# Patient Record
Sex: FEMALE | Race: WHITE | NOT HISPANIC OR LATINO | URBAN - METROPOLITAN AREA
[De-identification: names, ages, dates, MRNs, and addresses within clinical notes are randomized per-mention and may not be internally consistent; named-entity substitution may affect disease eponyms.]

---

## 2019-03-22 ENCOUNTER — EMERGENCY (EMERGENCY)
Facility: HOSPITAL | Age: 74
LOS: 1 days | Discharge: HOME | End: 2019-03-22
Attending: EMERGENCY MEDICINE | Admitting: EMERGENCY MEDICINE
Payer: MEDICARE

## 2019-03-22 VITALS
OXYGEN SATURATION: 96 % | TEMPERATURE: 98 F | HEART RATE: 71 BPM | SYSTOLIC BLOOD PRESSURE: 138 MMHG | DIASTOLIC BLOOD PRESSURE: 47 MMHG | RESPIRATION RATE: 17 BRPM

## 2019-03-22 VITALS
RESPIRATION RATE: 16 BRPM | TEMPERATURE: 99 F | OXYGEN SATURATION: 98 % | SYSTOLIC BLOOD PRESSURE: 139 MMHG | HEART RATE: 86 BPM | DIASTOLIC BLOOD PRESSURE: 61 MMHG

## 2019-03-22 LAB
ALBUMIN SERPL ELPH-MCNC: 3.5 G/DL — SIGNIFICANT CHANGE UP (ref 3.3–5)
ALP SERPL-CCNC: 68 U/L — SIGNIFICANT CHANGE UP (ref 40–120)
ALT FLD-CCNC: 22 U/L — SIGNIFICANT CHANGE UP (ref 4–33)
ANION GAP SERPL CALC-SCNC: 15 MMO/L — HIGH (ref 7–14)
AST SERPL-CCNC: 19 U/L — SIGNIFICANT CHANGE UP (ref 4–32)
BASE EXCESS BLDV CALC-SCNC: -4.8 MMOL/L — SIGNIFICANT CHANGE UP
BASE EXCESS BLDV CALC-SCNC: -6.7 MMOL/L — SIGNIFICANT CHANGE UP
BASOPHILS # BLD AUTO: 0.04 K/UL — SIGNIFICANT CHANGE UP (ref 0–0.2)
BASOPHILS NFR BLD AUTO: 0.3 % — SIGNIFICANT CHANGE UP (ref 0–2)
BILIRUB SERPL-MCNC: 0.7 MG/DL — SIGNIFICANT CHANGE UP (ref 0.2–1.2)
BLOOD GAS VENOUS - CREATININE: SIGNIFICANT CHANGE UP MG/DL (ref 0.5–1.3)
BLOOD GAS VENOUS - CREATININE: SIGNIFICANT CHANGE UP MG/DL (ref 0.5–1.3)
BUN SERPL-MCNC: 17 MG/DL — SIGNIFICANT CHANGE UP (ref 7–23)
CALCIUM SERPL-MCNC: 9.1 MG/DL — SIGNIFICANT CHANGE UP (ref 8.4–10.5)
CHLORIDE BLDV-SCNC: 112 MMOL/L — HIGH (ref 96–108)
CHLORIDE BLDV-SCNC: 113 MMOL/L — HIGH (ref 96–108)
CHLORIDE SERPL-SCNC: 103 MMOL/L — SIGNIFICANT CHANGE UP (ref 98–107)
CO2 SERPL-SCNC: 19 MMOL/L — LOW (ref 22–31)
CREAT SERPL-MCNC: 1.45 MG/DL — HIGH (ref 0.5–1.3)
EOSINOPHIL # BLD AUTO: 0.04 K/UL — SIGNIFICANT CHANGE UP (ref 0–0.5)
EOSINOPHIL NFR BLD AUTO: 0.3 % — SIGNIFICANT CHANGE UP (ref 0–6)
GAS PNL BLDV: 135 MMOL/L — LOW (ref 136–146)
GAS PNL BLDV: 135 MMOL/L — LOW (ref 136–146)
GLUCOSE BLDV-MCNC: 124 — HIGH (ref 70–99)
GLUCOSE BLDV-MCNC: 167 — HIGH (ref 70–99)
GLUCOSE SERPL-MCNC: 156 MG/DL — HIGH (ref 70–99)
HCO3 BLDV-SCNC: 18 MMOL/L — LOW (ref 20–27)
HCO3 BLDV-SCNC: 19 MMOL/L — LOW (ref 20–27)
HCT VFR BLD CALC: 45 % — SIGNIFICANT CHANGE UP (ref 34.5–45)
HCT VFR BLDV CALC: 38.3 % — SIGNIFICANT CHANGE UP (ref 34.5–45)
HCT VFR BLDV CALC: 49.8 % — HIGH (ref 34.5–45)
HGB BLD-MCNC: 15 G/DL — SIGNIFICANT CHANGE UP (ref 11.5–15.5)
HGB BLDV-MCNC: 12.4 G/DL — SIGNIFICANT CHANGE UP (ref 11.5–15.5)
HGB BLDV-MCNC: 16.3 G/DL — HIGH (ref 11.5–15.5)
IMM GRANULOCYTES NFR BLD AUTO: 0.7 % — SIGNIFICANT CHANGE UP (ref 0–1.5)
LACTATE BLDV-MCNC: 1.3 MMOL/L — SIGNIFICANT CHANGE UP (ref 0.5–2)
LACTATE BLDV-MCNC: 2.6 MMOL/L — HIGH (ref 0.5–2)
LIDOCAIN IGE QN: 65.3 U/L — HIGH (ref 7–60)
LYMPHOCYTES # BLD AUTO: 1.51 K/UL — SIGNIFICANT CHANGE UP (ref 1–3.3)
LYMPHOCYTES # BLD AUTO: 11.2 % — LOW (ref 13–44)
MCHC RBC-ENTMCNC: 30.8 PG — SIGNIFICANT CHANGE UP (ref 27–34)
MCHC RBC-ENTMCNC: 33.3 % — SIGNIFICANT CHANGE UP (ref 32–36)
MCV RBC AUTO: 92.4 FL — SIGNIFICANT CHANGE UP (ref 80–100)
MONOCYTES # BLD AUTO: 1.44 K/UL — HIGH (ref 0–0.9)
MONOCYTES NFR BLD AUTO: 10.7 % — SIGNIFICANT CHANGE UP (ref 2–14)
NEUTROPHILS # BLD AUTO: 10.36 K/UL — HIGH (ref 1.8–7.4)
NEUTROPHILS NFR BLD AUTO: 76.8 % — SIGNIFICANT CHANGE UP (ref 43–77)
NRBC # FLD: 0 K/UL — LOW (ref 25–125)
PCO2 BLDV: 39 MMHG — LOW (ref 41–51)
PCO2 BLDV: 41 MMHG — SIGNIFICANT CHANGE UP (ref 41–51)
PH BLDV: 7.28 PH — LOW (ref 7.32–7.43)
PH BLDV: 7.33 PH — SIGNIFICANT CHANGE UP (ref 7.32–7.43)
PLATELET # BLD AUTO: 421 K/UL — HIGH (ref 150–400)
PMV BLD: 11.5 FL — SIGNIFICANT CHANGE UP (ref 7–13)
PO2 BLDV: 23 MMHG — LOW (ref 35–40)
PO2 BLDV: 33 MMHG — LOW (ref 35–40)
POTASSIUM BLDV-SCNC: 3.6 MMOL/L — SIGNIFICANT CHANGE UP (ref 3.4–4.5)
POTASSIUM BLDV-SCNC: 3.8 MMOL/L — SIGNIFICANT CHANGE UP (ref 3.4–4.5)
POTASSIUM SERPL-MCNC: 3.9 MMOL/L — SIGNIFICANT CHANGE UP (ref 3.5–5.3)
POTASSIUM SERPL-SCNC: 3.9 MMOL/L — SIGNIFICANT CHANGE UP (ref 3.5–5.3)
PROT SERPL-MCNC: 6.5 G/DL — SIGNIFICANT CHANGE UP (ref 6–8.3)
RBC # BLD: 4.87 M/UL — SIGNIFICANT CHANGE UP (ref 3.8–5.2)
RBC # FLD: 13.9 % — SIGNIFICANT CHANGE UP (ref 10.3–14.5)
SAO2 % BLDV: 35.3 % — LOW (ref 60–85)
SAO2 % BLDV: 55.6 % — LOW (ref 60–85)
SODIUM SERPL-SCNC: 137 MMOL/L — SIGNIFICANT CHANGE UP (ref 135–145)
TROPONIN T, HIGH SENSITIVITY: 18 NG/L — SIGNIFICANT CHANGE UP (ref ?–14)
TROPONIN T, HIGH SENSITIVITY: 19 NG/L — SIGNIFICANT CHANGE UP (ref ?–14)
WBC # BLD: 13.48 K/UL — HIGH (ref 3.8–10.5)
WBC # FLD AUTO: 13.48 K/UL — HIGH (ref 3.8–10.5)

## 2019-03-22 PROCEDURE — 93010 ELECTROCARDIOGRAM REPORT: CPT

## 2019-03-22 PROCEDURE — 99285 EMERGENCY DEPT VISIT HI MDM: CPT | Mod: 25

## 2019-03-22 PROCEDURE — 71046 X-RAY EXAM CHEST 2 VIEWS: CPT | Mod: 26

## 2019-03-22 PROCEDURE — 70486 CT MAXILLOFACIAL W/O DYE: CPT | Mod: 26

## 2019-03-22 PROCEDURE — 72125 CT NECK SPINE W/O DYE: CPT | Mod: 26

## 2019-03-22 PROCEDURE — 70450 CT HEAD/BRAIN W/O DYE: CPT | Mod: 26

## 2019-03-22 PROCEDURE — 74177 CT ABD & PELVIS W/CONTRAST: CPT | Mod: 26

## 2019-03-22 RX ORDER — ONDANSETRON 8 MG/1
1 TABLET, FILM COATED ORAL
Qty: 15 | Refills: 0 | OUTPATIENT
Start: 2019-03-22 | End: 2019-03-26

## 2019-03-22 RX ORDER — MORPHINE SULFATE 50 MG/1
2 CAPSULE, EXTENDED RELEASE ORAL ONCE
Qty: 0 | Refills: 0 | Status: DISCONTINUED | OUTPATIENT
Start: 2019-03-22 | End: 2019-03-22

## 2019-03-22 RX ORDER — LOPERAMIDE HCL 2 MG
1 TABLET ORAL
Qty: 5 | Refills: 0 | OUTPATIENT
Start: 2019-03-22 | End: 2019-03-26

## 2019-03-22 RX ORDER — SODIUM CHLORIDE 9 MG/ML
500 INJECTION INTRAMUSCULAR; INTRAVENOUS; SUBCUTANEOUS ONCE
Qty: 0 | Refills: 0 | Status: COMPLETED | OUTPATIENT
Start: 2019-03-22 | End: 2019-03-22

## 2019-03-22 RX ORDER — FAMOTIDINE 10 MG/ML
20 INJECTION INTRAVENOUS ONCE
Qty: 0 | Refills: 0 | Status: COMPLETED | OUTPATIENT
Start: 2019-03-22 | End: 2019-03-22

## 2019-03-22 RX ORDER — HYDROCORTISONE/PRAMOXINE 2.5 %-1 %
1 CREAM WITH APPLICATOR RECTAL
Qty: 1 | Refills: 0 | OUTPATIENT
Start: 2019-03-22

## 2019-03-22 RX ORDER — SODIUM CHLORIDE 9 MG/ML
1000 INJECTION INTRAMUSCULAR; INTRAVENOUS; SUBCUTANEOUS ONCE
Qty: 0 | Refills: 0 | Status: COMPLETED | OUTPATIENT
Start: 2019-03-22 | End: 2019-03-22

## 2019-03-22 RX ADMIN — MORPHINE SULFATE 2 MILLIGRAM(S): 50 CAPSULE, EXTENDED RELEASE ORAL at 15:24

## 2019-03-22 RX ADMIN — SODIUM CHLORIDE 1000 MILLILITER(S): 9 INJECTION INTRAMUSCULAR; INTRAVENOUS; SUBCUTANEOUS at 15:05

## 2019-03-22 RX ADMIN — SODIUM CHLORIDE 500 MILLILITER(S): 9 INJECTION INTRAMUSCULAR; INTRAVENOUS; SUBCUTANEOUS at 17:00

## 2019-03-22 RX ADMIN — FAMOTIDINE 20 MILLIGRAM(S): 10 INJECTION INTRAVENOUS at 15:05

## 2019-03-22 NOTE — ED PROVIDER NOTE - CLINICAL SUMMARY MEDICAL DECISION MAKING FREE TEXT BOX
73 yo F, nonsmoker with PMH of HTN, preDM, presents to ER c/o epigastric pain a/w nausea and persistent non-bloody diarrhea x2 weeks. Well appearing female, recently return from [facility], p/w acute epigastric abdominal pain a/w nausea and non-bloody diarrhea, recently seen by NP, started on Cipro 3 days ago, had neg stool culture result today, +syncope x2 and fell 3 days ago, unwitnessed w/ nasal injury & epistaxis, on Aspirin 81mg, no focal neuro deficits, no headache, no neck pain, no weakness, no numbness, likely viral gastroenteritis, colitis, given hx of syncope x2 w/ fall w/ nasal injury, will CT head/maxillofacial/cervical, CT A/P to r/o acute intra-abd pathology, check labs, Ua, stool cx, give IVF, pain control, and reassess. 73 yo F, nonsmoker with PMH of HTN, preDM, presents to ER c/o epigastric pain a/w nausea and persistent non-bloody diarrhea x2 weeks. Well appearing female, recently return from [facility], p/w acute epigastric abdominal pain a/w nausea and non-bloody diarrhea, recently seen by NP, started on Cipro 3 days ago, had neg stool culture result today, +syncope x2 and fell 3 days ago, unwitnessed w/ nasal injury & epistaxis, on Aspirin 81mg, no focal neuro deficits, no headache, no neck pain, no weakness, no numbness. 1) abd pain w/ diarrhea likely viral gastroenteritis, colitis. 2) syncope likely vasal vagal vs dehydration vs ACS, will CT head/maxillofacial/cervical, CT A/P to r/o acute intra-abd pathology, CXR, check labs, Trop, lipase, Ua, give IVF, pain control, and reassess.

## 2019-03-22 NOTE — ED PROVIDER NOTE - PROGRESS NOTE DETAILS
PA MEY: Patient reassessed, sitting comfortably in bed in NAD, denies any complaints. States feeling better, symptoms improved. trop 19, lactate 2.6. pending CT. will repeat trop and lactate. Patient reassessed, sitting comfortably in bed in NAD, denies any complaints. States feeling better, symptoms improved. Trop 19, 18. CT A/P suggestive of colitis. Discussed w/ attending, will discharge with imodium, zofran, percocet PRN, and GI follow up, return precaution given. Pending CT head/maxillofacial. Will continue to monitor and reassess. PA MEY: CT head/cervical/maxillofacial neg acute finding. Pt is medically stable for discharge and follow up with PMD and GI. The patient was given verbal and written discharge instructions. Specifically, instructions when to return to the ED and when to seek follow-up from their pcp was discussed. Any specialty follow-up was discussed, including how to make an appointment.  Instructions were discussed in simple, plain language and was understood by the patient. The patient understands that should their symptoms worsen or any new symptoms arise, they should return to the ED immediately for further evaluation. All pt's questions were answered. Patient verbalizes understanding.

## 2019-03-22 NOTE — ED PROVIDER NOTE - NS ED ATTENDING STATEMENT MOD
14
I have personally performed a face to face diagnostic evaluation on this patient. I have reviewed the ACP note and agree with the history, exam and plan of care, except as noted.

## 2019-03-22 NOTE — ED PROVIDER NOTE - HEAD, MLM
Head is atraumatic. Head shape is symmetrical. no tenderness, no signs of trauma, neg gruber signs, no raccoon signs b/l.

## 2019-03-22 NOTE — ED PROVIDER NOTE - NONTENDER LOCATION
right lower quadrant/periumbilical/left costovertebral angle/left upper quadrant/right upper quadrant/left lower quadrant/umbilical/suprapubic/right costovertebral angle

## 2019-03-22 NOTE — ED PROVIDER NOTE - ATTENDING CONTRIBUTION TO CARE
DR. DICKEY, ATTENDING MD-  I performed a face to face bedside interview with patient regarding history of present illness, review of symptoms and past medical history. I completed an independent physical exam.  I have discussed patient's plan of care with PA.   Documentation as above in the note.    75 y/o female h/o htn, syncope with abd pain, nausea and diarrhea x2 wks since trip to [place].  Sick contact in  who had similar sx previously.  No abx use prior to sx onset.  Seen by her NP few days ago and had neg stool culture, started on cipro for presumed bacterial diarrhea.  Had syncopal episode few days ago.  She states she frequently has syncope when in pain.  Had pos head trauma though no pain at this time.  Denies f/c, ha, neck stiffness, cp, sob, cough, dysuria, rash.  Afebrile, vs wnl, nad, ncat, dry mm, ctabil, s1s2 rrr no m/r/g, abd soft non ttp no r/g, no cva tenderness b/l, no leg swelling b/l, no rash.  Eval for colitis vs diverticulitis, unlikely viral gastro or foodborne illness given chronicity of sx, check electrolytes.  Obtain cbc, cmp, ua, ucx, ct a/p, ekg, give ivf, pain med, reassess.

## 2019-03-22 NOTE — ED PROVIDER NOTE - OBJECTIVE STATEMENT
73 yo F, nonsmoker with PMH of HTN, preDM, presents to ER c/o epigastric pain a/w nausea and persistent non-bloody diarrhea x2 weeks. Pt states she came back from [place], started having epigastric pain, pressure, feels like moving all over her abdomen, 7/10, worse with movements, better with heating pad. Reports having watery diarrhea, non-bloody every 1.5 hrs for the past 2 weeks. Admits feel weak and dehydrated, not eating, not drinking, and not sleeping well. Reports she passed out last Wednesday, lasted for a few seconds, on her ; and also passed out on Tuesday, 3 days ago, while sitting on the toilet, felt forward, woke up with nasal bleeding. Reports she didn't called EMS or go to the ER. As per  and daughter at bedside, patient usually passed out with pain and blood, happens once or twice a year. States seen by a NP for abdominal pain and diarrhea, thought it was viral, but started on Cipro 3 days ago. Reports negative stool culture today and stopped antibiotic use. Admits taking Aspirin 81mg daily, stopped for 1 week. Denies any fever, chills, headache, dizziness, blurry vision, neck pain, vomiting, sob, back pain, weakness, numbness, leg swelling, hx of DVT/PE, or any other complaints. 73 yo F, nonsmoker with PMH of HTN, preDM, presents to ER c/o epigastric pain a/w nausea and persistent non-bloody diarrhea x2 weeks. Pt states she came back from [place], started having intermittent epigastric pain, pressure, feels like moving all over her abdomen, 7/10, worse with movements, better with heating pad. Reports having watery diarrhea, non-bloody every 1.5 hrs for the past 2 weeks. Admits feel weak and dehydrated, not eating, not drinking, and not sleeping well. Reports she passed out last Wednesday, lasted for a few seconds, on her ; and also passed out on Tuesday, 3 days ago, while sitting on the toilet, felt forward, woke up with nasal bleeding. Reports she didn't called EMS or go to the ER. As per  and daughter at bedside, patient usually passed out with pain and blood, happens once or twice a year. States seen by a NP for abdominal pain and diarrhea, thought it was viral, but started on Cipro 3 days ago. Reports negative stool culture today and stopped antibiotic use. Admits taking Aspirin 81mg daily, stopped for 1 week. Denies any fever, chills, headache, dizziness, blurry vision, neck pain, vomiting, sob, back pain, weakness, numbness, leg swelling, hx of DVT/PE, or any other complaints.

## 2019-03-22 NOTE — ED ADULT TRIAGE NOTE - CHIEF COMPLAINT QUOTE
pt returned from [facility] 2 weeks ago and since then pt c/o mid abdominal pain with nausea/frequent diarrhea, generalized weakness. Seen by PCP 1 week ago and on Tuesday. Negative stool culture, results with pt. Placed on Cipro since Tuesday . Does not feel any better.

## 2019-03-22 NOTE — ED PROVIDER NOTE - NSFOLLOWUPINSTRUCTIONS_ED_ALL_ED_FT
Rest, drink plenty of fluids.  Advance activity as tolerated. Take Imodium as directed.  Percocet 1 tablet every 8 hrs as needed for breakthrough discomfort- caution drowsiness while taking this medication- do not drive or operate heavy machinery. Take Zofran 4 mg ODT every 8 hours as needed for nausea and vomiting. Follow up with your primary care physician and gastroenterology in 48-72 hours- bring copies of your results.  Return to the ER for worsening or persistent symptoms, and/or ANY NEW OR CONCERNING SYMPTOMS. If you have issues obtaining follow up, please call: [phone] to obtain a doctor or specialist who takes your insurance in your area.

## 2019-03-25 ENCOUNTER — APPOINTMENT (OUTPATIENT)
Dept: GASTROENTEROLOGY | Facility: CLINIC | Age: 74
End: 2019-03-25
Payer: MEDICARE

## 2019-03-25 VITALS
HEART RATE: 89 BPM | HEIGHT: 62 IN | SYSTOLIC BLOOD PRESSURE: 100 MMHG | DIASTOLIC BLOOD PRESSURE: 63 MMHG | WEIGHT: 149 LBS | TEMPERATURE: 98.4 F | OXYGEN SATURATION: 97 % | BODY MASS INDEX: 27.42 KG/M2

## 2019-03-25 DIAGNOSIS — K30 FUNCTIONAL DYSPEPSIA: ICD-10-CM

## 2019-03-25 DIAGNOSIS — R19.7 DIARRHEA, UNSPECIFIED: ICD-10-CM

## 2019-03-25 DIAGNOSIS — R10.84 GENERALIZED ABDOMINAL PAIN: ICD-10-CM

## 2019-03-25 PROBLEM — R73.03 PREDIABETES: Chronic | Status: ACTIVE | Noted: 2019-03-22

## 2019-03-25 PROBLEM — I10 ESSENTIAL (PRIMARY) HYPERTENSION: Chronic | Status: ACTIVE | Noted: 2019-03-22

## 2019-03-25 PROCEDURE — 99204 OFFICE O/P NEW MOD 45 MIN: CPT

## 2019-03-26 ENCOUNTER — TRANSCRIPTION ENCOUNTER (OUTPATIENT)
Age: 74
End: 2019-03-26

## 2019-03-27 LAB
C DIFF TOX GENS STL QL NAA+PROBE: NORMAL
CDIFF BY PCR: NOT DETECTED
G LAMBLIA AG STL QL: NORMAL
HEMOCCULT STL QL: NEGATIVE

## 2019-03-29 LAB — BACTERIA STL CULT: NORMAL

## 2019-03-30 ENCOUNTER — INPATIENT (INPATIENT)
Facility: HOSPITAL | Age: 74
LOS: 2 days | Discharge: HOME | DRG: 871 | End: 2019-04-02
Attending: HOSPITALIST | Admitting: HOSPITALIST
Payer: COMMERCIAL

## 2019-03-30 VITALS
TEMPERATURE: 98 F | RESPIRATION RATE: 20 BRPM | WEIGHT: 147.93 LBS | SYSTOLIC BLOOD PRESSURE: 92 MMHG | OXYGEN SATURATION: 97 % | HEIGHT: 62 IN | DIASTOLIC BLOOD PRESSURE: 60 MMHG | HEART RATE: 105 BPM

## 2019-03-30 DIAGNOSIS — N17.9 ACUTE KIDNEY FAILURE, UNSPECIFIED: ICD-10-CM

## 2019-03-30 DIAGNOSIS — I10 ESSENTIAL (PRIMARY) HYPERTENSION: ICD-10-CM

## 2019-03-30 DIAGNOSIS — D72.829 ELEVATED WHITE BLOOD CELL COUNT, UNSPECIFIED: ICD-10-CM

## 2019-03-30 DIAGNOSIS — R21 RASH AND OTHER NONSPECIFIC SKIN ERUPTION: ICD-10-CM

## 2019-03-30 DIAGNOSIS — A41.9 SEPSIS, UNSPECIFIED ORGANISM: ICD-10-CM

## 2019-03-30 DIAGNOSIS — R19.7 DIARRHEA, UNSPECIFIED: ICD-10-CM

## 2019-03-30 DIAGNOSIS — Z29.9 ENCOUNTER FOR PROPHYLACTIC MEASURES, UNSPECIFIED: ICD-10-CM

## 2019-03-30 LAB
ALBUMIN SERPL ELPH-MCNC: 2.4 G/DL — LOW (ref 3.5–5)
ALP SERPL-CCNC: 118 U/L — SIGNIFICANT CHANGE UP (ref 40–120)
ALT FLD-CCNC: 31 U/L DA — SIGNIFICANT CHANGE UP (ref 10–60)
ANION GAP SERPL CALC-SCNC: 11 MMOL/L — SIGNIFICANT CHANGE UP (ref 5–17)
APPEARANCE UR: ABNORMAL
AST SERPL-CCNC: 15 U/L — SIGNIFICANT CHANGE UP (ref 10–40)
BILIRUB SERPL-MCNC: 0.4 MG/DL — SIGNIFICANT CHANGE UP (ref 0.2–1.2)
BILIRUB UR-MCNC: ABNORMAL
BUN SERPL-MCNC: 26 MG/DL — HIGH (ref 7–18)
CALCIUM SERPL-MCNC: 7.7 MG/DL — LOW (ref 8.4–10.5)
CHLORIDE SERPL-SCNC: 104 MMOL/L — SIGNIFICANT CHANGE UP (ref 96–108)
CO2 SERPL-SCNC: 18 MMOL/L — LOW (ref 22–31)
COLOR SPEC: YELLOW — SIGNIFICANT CHANGE UP
CREAT SERPL-MCNC: 3.55 MG/DL — HIGH (ref 0.5–1.3)
DIFF PNL FLD: ABNORMAL
GLUCOSE SERPL-MCNC: 198 MG/DL — HIGH (ref 70–99)
GLUCOSE UR QL: NEGATIVE — SIGNIFICANT CHANGE UP
HAV IGG SER QL IA: REACTIVE
HAV IGM SER-ACNC: SIGNIFICANT CHANGE UP
HCT VFR BLD CALC: 42.2 % — SIGNIFICANT CHANGE UP (ref 34.5–45)
HCT VFR BLD CALC: 46.4 % — HIGH (ref 34.5–45)
HCV AB S/CO SERPL IA: 0.06 S/CO — SIGNIFICANT CHANGE UP (ref 0–0.79)
HCV AB SERPL-IMP: SIGNIFICANT CHANGE UP
HGB BLD-MCNC: 14.5 G/DL — SIGNIFICANT CHANGE UP (ref 11.5–15.5)
HGB BLD-MCNC: 15.6 G/DL — HIGH (ref 11.5–15.5)
KETONES UR-MCNC: NEGATIVE — SIGNIFICANT CHANGE UP
LACTATE SERPL-SCNC: 1.9 MMOL/L — SIGNIFICANT CHANGE UP (ref 0.7–2)
LACTATE SERPL-SCNC: 2.3 MMOL/L — HIGH (ref 0.7–2)
LEUKOCYTE ESTERASE UR-ACNC: ABNORMAL
MCHC RBC-ENTMCNC: 31.1 PG — SIGNIFICANT CHANGE UP (ref 27–34)
MCHC RBC-ENTMCNC: 31.5 PG — SIGNIFICANT CHANGE UP (ref 27–34)
MCHC RBC-ENTMCNC: 33.6 GM/DL — SIGNIFICANT CHANGE UP (ref 32–36)
MCHC RBC-ENTMCNC: 34.4 GM/DL — SIGNIFICANT CHANGE UP (ref 32–36)
MCV RBC AUTO: 91.5 FL — SIGNIFICANT CHANGE UP (ref 80–100)
MCV RBC AUTO: 92.4 FL — SIGNIFICANT CHANGE UP (ref 80–100)
NITRITE UR-MCNC: NEGATIVE — SIGNIFICANT CHANGE UP
NRBC # BLD: 0 /100 WBCS — SIGNIFICANT CHANGE UP (ref 0–0)
NRBC # BLD: 0 /100 WBCS — SIGNIFICANT CHANGE UP (ref 0–0)
PH UR: 5 — SIGNIFICANT CHANGE UP (ref 5–8)
PLATELET # BLD AUTO: 403 K/UL — HIGH (ref 150–400)
PLATELET # BLD AUTO: 451 K/UL — HIGH (ref 150–400)
POTASSIUM SERPL-MCNC: 3.3 MMOL/L — LOW (ref 3.5–5.3)
POTASSIUM SERPL-SCNC: 3.3 MMOL/L — LOW (ref 3.5–5.3)
PROT SERPL-MCNC: 5.7 G/DL — LOW (ref 6–8.3)
PROT UR-MCNC: 30 MG/DL
RBC # BLD: 4.61 M/UL — SIGNIFICANT CHANGE UP (ref 3.8–5.2)
RBC # BLD: 5.02 M/UL — SIGNIFICANT CHANGE UP (ref 3.8–5.2)
RBC # FLD: 14.4 % — SIGNIFICANT CHANGE UP (ref 10.3–14.5)
RBC # FLD: 14.6 % — HIGH (ref 10.3–14.5)
SODIUM SERPL-SCNC: 133 MMOL/L — LOW (ref 135–145)
SP GR SPEC: 1.01 — SIGNIFICANT CHANGE UP (ref 1.01–1.02)
UROBILINOGEN FLD QL: NEGATIVE — SIGNIFICANT CHANGE UP
WBC # BLD: 40.96 K/UL — CRITICAL HIGH (ref 3.8–10.5)
WBC # BLD: 43.53 K/UL — CRITICAL HIGH (ref 3.8–10.5)
WBC # FLD AUTO: 40.96 K/UL — CRITICAL HIGH (ref 3.8–10.5)
WBC # FLD AUTO: 43.53 K/UL — CRITICAL HIGH (ref 3.8–10.5)

## 2019-03-30 PROCEDURE — 74018 RADEX ABDOMEN 1 VIEW: CPT | Mod: 26

## 2019-03-30 PROCEDURE — 71045 X-RAY EXAM CHEST 1 VIEW: CPT | Mod: 26

## 2019-03-30 PROCEDURE — 99223 1ST HOSP IP/OBS HIGH 75: CPT

## 2019-03-30 PROCEDURE — 99284 EMERGENCY DEPT VISIT MOD MDM: CPT

## 2019-03-30 RX ORDER — CEFTRIAXONE 500 MG/1
1 INJECTION, POWDER, FOR SOLUTION INTRAMUSCULAR; INTRAVENOUS EVERY 24 HOURS
Qty: 0 | Refills: 0 | Status: DISCONTINUED | OUTPATIENT
Start: 2019-03-30 | End: 2019-03-30

## 2019-03-30 RX ORDER — CIPROFLOXACIN LACTATE 400MG/40ML
400 VIAL (ML) INTRAVENOUS ONCE
Qty: 0 | Refills: 0 | Status: COMPLETED | OUTPATIENT
Start: 2019-03-30 | End: 2019-03-30

## 2019-03-30 RX ORDER — HEPARIN SODIUM 5000 [USP'U]/ML
5000 INJECTION INTRAVENOUS; SUBCUTANEOUS EVERY 8 HOURS
Qty: 0 | Refills: 0 | Status: DISCONTINUED | OUTPATIENT
Start: 2019-03-30 | End: 2019-04-02

## 2019-03-30 RX ORDER — DIPHENHYDRAMINE HCL 50 MG
50 CAPSULE ORAL EVERY 6 HOURS
Qty: 0 | Refills: 0 | Status: COMPLETED | OUTPATIENT
Start: 2019-03-30 | End: 2019-04-01

## 2019-03-30 RX ORDER — FELODIPINE 5 MG/1
0 TABLET, FILM COATED, EXTENDED RELEASE ORAL
Qty: 0 | Refills: 0 | COMMUNITY

## 2019-03-30 RX ORDER — SODIUM CHLORIDE 9 MG/ML
1000 INJECTION INTRAMUSCULAR; INTRAVENOUS; SUBCUTANEOUS ONCE
Qty: 0 | Refills: 0 | Status: COMPLETED | OUTPATIENT
Start: 2019-03-30 | End: 2019-03-30

## 2019-03-30 RX ORDER — SODIUM CHLORIDE 9 MG/ML
1000 INJECTION INTRAMUSCULAR; INTRAVENOUS; SUBCUTANEOUS
Qty: 0 | Refills: 0 | Status: DISCONTINUED | OUTPATIENT
Start: 2019-03-30 | End: 2019-04-02

## 2019-03-30 RX ORDER — POTASSIUM CHLORIDE 20 MEQ
40 PACKET (EA) ORAL EVERY 4 HOURS
Qty: 0 | Refills: 0 | Status: COMPLETED | OUTPATIENT
Start: 2019-03-30 | End: 2019-03-30

## 2019-03-30 RX ORDER — METRONIDAZOLE 500 MG
500 TABLET ORAL ONCE
Qty: 0 | Refills: 0 | Status: COMPLETED | OUTPATIENT
Start: 2019-03-30 | End: 2019-03-30

## 2019-03-30 RX ORDER — SODIUM CHLORIDE 9 MG/ML
2100 INJECTION INTRAMUSCULAR; INTRAVENOUS; SUBCUTANEOUS ONCE
Qty: 0 | Refills: 0 | Status: COMPLETED | OUTPATIENT
Start: 2019-03-30 | End: 2019-03-30

## 2019-03-30 RX ORDER — DIPHENHYDRAMINE HCL 50 MG
25 CAPSULE ORAL ONCE
Qty: 0 | Refills: 0 | Status: COMPLETED | OUTPATIENT
Start: 2019-03-30 | End: 2019-03-30

## 2019-03-30 RX ORDER — VANCOMYCIN HCL 1 G
1000 VIAL (EA) INTRAVENOUS ONCE
Qty: 0 | Refills: 0 | Status: COMPLETED | OUTPATIENT
Start: 2019-03-30 | End: 2019-03-30

## 2019-03-30 RX ORDER — CEFTRIAXONE 500 MG/1
INJECTION, POWDER, FOR SOLUTION INTRAMUSCULAR; INTRAVENOUS
Qty: 0 | Refills: 0 | Status: DISCONTINUED | OUTPATIENT
Start: 2019-03-30 | End: 2019-03-30

## 2019-03-30 RX ORDER — FAMOTIDINE 10 MG/ML
20 INJECTION INTRAVENOUS ONCE
Qty: 0 | Refills: 0 | Status: COMPLETED | OUTPATIENT
Start: 2019-03-30 | End: 2019-03-30

## 2019-03-30 RX ORDER — CALAMINE AND ZINC OXIDE AND PHENOL 160; 10 MG/ML; MG/ML
1 LOTION TOPICAL
Qty: 0 | Refills: 0 | Status: DISCONTINUED | OUTPATIENT
Start: 2019-03-30 | End: 2019-04-02

## 2019-03-30 RX ORDER — METOPROLOL TARTRATE 50 MG
0 TABLET ORAL
Qty: 0 | Refills: 0 | COMMUNITY

## 2019-03-30 RX ORDER — HYDROXYZINE HCL 10 MG
25 TABLET ORAL ONCE
Qty: 0 | Refills: 0 | Status: COMPLETED | OUTPATIENT
Start: 2019-03-30 | End: 2019-03-30

## 2019-03-30 RX ORDER — DEXAMETHASONE 0.5 MG/5ML
10 ELIXIR ORAL ONCE
Qty: 0 | Refills: 0 | Status: COMPLETED | OUTPATIENT
Start: 2019-03-30 | End: 2019-03-30

## 2019-03-30 RX ORDER — FAMOTIDINE 10 MG/ML
20 INJECTION INTRAVENOUS DAILY
Qty: 0 | Refills: 0 | Status: DISCONTINUED | OUTPATIENT
Start: 2019-03-30 | End: 2019-04-01

## 2019-03-30 RX ADMIN — Medication 250 MILLIGRAM(S): at 06:01

## 2019-03-30 RX ADMIN — CEFTRIAXONE 100 GRAM(S): 500 INJECTION, POWDER, FOR SOLUTION INTRAMUSCULAR; INTRAVENOUS at 15:31

## 2019-03-30 RX ADMIN — SODIUM CHLORIDE 75 MILLILITER(S): 9 INJECTION INTRAMUSCULAR; INTRAVENOUS; SUBCUTANEOUS at 12:07

## 2019-03-30 RX ADMIN — Medication 200 MILLIGRAM(S): at 06:00

## 2019-03-30 RX ADMIN — HEPARIN SODIUM 5000 UNIT(S): 5000 INJECTION INTRAVENOUS; SUBCUTANEOUS at 23:34

## 2019-03-30 RX ADMIN — Medication 40 MILLIEQUIVALENT(S): at 08:12

## 2019-03-30 RX ADMIN — Medication 50 MILLIGRAM(S): at 23:36

## 2019-03-30 RX ADMIN — Medication 50 MILLIGRAM(S): at 11:47

## 2019-03-30 RX ADMIN — CALAMINE AND ZINC OXIDE AND PHENOL 1 APPLICATION(S): 160; 10 LOTION TOPICAL at 15:31

## 2019-03-30 RX ADMIN — FAMOTIDINE 20 MILLIGRAM(S): 10 INJECTION INTRAVENOUS at 06:38

## 2019-03-30 RX ADMIN — Medication 40 MILLIEQUIVALENT(S): at 09:39

## 2019-03-30 RX ADMIN — Medication 10 MILLIGRAM(S): at 03:37

## 2019-03-30 RX ADMIN — Medication 100 MILLIGRAM(S): at 06:01

## 2019-03-30 RX ADMIN — CALAMINE AND ZINC OXIDE AND PHENOL 1 APPLICATION(S): 160; 10 LOTION TOPICAL at 23:35

## 2019-03-30 RX ADMIN — CALAMINE AND ZINC OXIDE AND PHENOL 1 APPLICATION(S): 160; 10 LOTION TOPICAL at 18:59

## 2019-03-30 RX ADMIN — Medication 25 MILLIGRAM(S): at 04:50

## 2019-03-30 RX ADMIN — HEPARIN SODIUM 5000 UNIT(S): 5000 INJECTION INTRAVENOUS; SUBCUTANEOUS at 15:31

## 2019-03-30 RX ADMIN — FAMOTIDINE 20 MILLIGRAM(S): 10 INJECTION INTRAVENOUS at 03:37

## 2019-03-30 RX ADMIN — Medication 50 MILLIGRAM(S): at 18:11

## 2019-03-30 RX ADMIN — Medication 25 MILLIGRAM(S): at 03:37

## 2019-03-30 RX ADMIN — SODIUM CHLORIDE 2100 MILLILITER(S): 9 INJECTION INTRAMUSCULAR; INTRAVENOUS; SUBCUTANEOUS at 06:01

## 2019-03-30 RX ADMIN — Medication 50 MILLIGRAM(S): at 06:38

## 2019-03-30 RX ADMIN — SODIUM CHLORIDE 1000 MILLILITER(S): 9 INJECTION INTRAMUSCULAR; INTRAVENOUS; SUBCUTANEOUS at 03:38

## 2019-03-30 NOTE — H&P ADULT - HISTORY OF PRESENT ILLNESS
Pt is a 74F, from home, w/ PMHx of Plaque Psoriasis, and Htn who presents with diffuse rash x 1 days.  Pt states She was recently started on new medication by her GI specialist, Dr. Otoole, for persistent diarrhea- dicyclomine. She took the medication Thursday night and awoke with a diffuse rash Friday morning encompassing the entire body including face, chest, back, arms, legs, and palms, sparing the soles.  She describes it as red and very itchy.  Pt states she recently returned from a trip to [facility] on 3/8 at which point she developed diffuse water diarrhea and abd pain.  On 3/22, Pt went to the ER and syncopized due to severe abd pain.  She underwent cardiac workup which was neg, but diarrhea persisted.  Stool cultures for infectious cause were negative including C.diff.   She was discharged and went to [facility], Dr. Otoole, who prescribed her prednisone, dicyclomine, and BRAT diet, and the diarrhea improved. She states she clinically improved, and presented for the rash.  Pt denies sick contacts.  States that she did not go in the water or pools when in [facility].  She denies consuming unusual foods.  Pt denies GI problems in the past.   Pt denies fever, abd pain, blood tinged stool, N/V, HA, Urinary symptoms, dizziness, weakness, numbness, blurred vision, or slurred speech.    In the ED, pt presents in no acute distress, vitals sig for BP of 92/60, HR of 105, and EKG NSR

## 2019-03-30 NOTE — ED PROVIDER NOTE - OBJECTIVE STATEMENT
75 y/o F pt with a PMHx of HTN, prediabetes, presents to the ED with complaints of allergic skin reaction for the past 2 days after she took cholestyramine and dicyclomine. Patient reports she developed pruritic skin rash initially on her chest then generalized throughout the body. Patient states she went to her PCP and started on Pepcid 20mg 2x a daily and prednisone 5mg. Patient notes initially the allergic reaction skin rash seemed to improve but it worsened tonight, she could not sleep because it was too itchy. Patient denies difficulty breathing, difficulty swallowing, facial, tongue or lip swelling, nausea, vomiting, diarrhea, fever or any other complaints. Patient notes she knows she is only allergic to penicillin and has no other known allergies.

## 2019-03-30 NOTE — H&P ADULT - PROBLEM SELECTOR PLAN 2
-Diffuse Urticarial rash post Dicyclomine use involving face, arms, back, chest, legs, and palms   -s/p Decadron, Benadryl and Pepcid  -mild improvement of symptoms, but rash persists  -will continue Solumedrol, Benadryl and pepcid for now and hold inciting agent   -Will test for Coxsackie, Syphilis, Mycoplasma and Parvovirus

## 2019-03-30 NOTE — H&P ADULT - RS GEN PE MLT RESP DETAILS PC
respirations non-labored/airway patent/good air movement/breath sounds equal/clear to auscultation bilaterally/no rhonchi/no rales/no wheezes

## 2019-03-30 NOTE — ED ADULT NURSE NOTE - ED STAT RN HANDOFF DETAILS 2
admitted , nad , no diarrhea noted all day , Reg floor , endorsed to next nurse Nebres in stable condition /.

## 2019-03-30 NOTE — ED PROVIDER NOTE - CHPI ED SYMPTOMS NEG
no difficulty breathing/no nausea/no diarrhea, no nausea, no fever/no swelling of face, tongue/no vomiting/no difficulty swallowing

## 2019-03-30 NOTE — CONSULT NOTE ADULT - SUBJECTIVE AND OBJECTIVE BOX
HPI:  Pt is a 74F, from home, w/ PMHx of Plaque Psoriasis, and Htn who presents with diffuse rash x 1 days.  Pt states She was recently started on new medication by her GI specialist, Dr. Otoole, for persistent diarrhea- dicyclomine. She took the medication Thursday night and awoke with a diffuse rash Friday morning encompassing the entire body including face, chest, back, arms, legs, and palms, sparing the soles.  She describes it as red and very itchy.  Pt states she recently returned from a trip to [facility] on 3/8 at which point she developed diffuse water diarrhea and abd pain.  On 3/22, Pt went to the ER and syncopized due to severe abd pain.  She underwent cardiac workup which was neg, but diarrhea persisted.  Stool cultures for infectious cause were negative including C.diff.   She was discharged and went to [facility], Dr. Otoole, who prescribed her prednisone, dicyclomine, and BRAT diet, and the diarrhea improved. She states she clinically improved, and presented for the rash.  Pt denies sick contacts.  States that she did not go in the water or pools when in [facility].  She denies consuming unusual foods.  Pt denies GI problems in the past.   Pt denies fever, abd pain, blood tinged stool, N/V, HA, Urinary symptoms, dizziness, weakness, numbness, blurred vision, or slurred speech.    In the ED, pt presents in no acute distress, vitals sig for BP of 92/60, HR of 105, and EKG NSR (30 Mar 2019 06:48)      PAST MEDICAL & SURGICAL HISTORY:  Prediabetes  HTN (hypertension)  No significant past surgical history      cholestyramine (Rash; Urticaria; Hives)  dicyclomine (Rash; Urticaria; Hives)  penicillins (Rash)      Meds:  calamine Lotion 1 Application(s) Topical four times a day  diphenhydrAMINE   Injectable 50 milliGRAM(s) IV Push every 6 hours  famotidine Injectable 20 milliGRAM(s) IV Push daily  heparin  Injectable 5000 Unit(s) SubCutaneous every 8 hours  sodium chloride 0.9%. 1000 milliLiter(s) IV Continuous <Continuous>      SOCIAL HISTORY:  Smoker:  YES / NO        PACK YEARS:                         WHEN QUIT?  ETOH use:  YES / NO               FREQUENCY / QUANTITY:  Ilicit Drug use:  YES / NO  Occupation:  Assisted device use (Cane / Walker):  Live with:    FAMILY HISTORY:      VITALS:  Vital Signs Last 24 Hrs  T(C): 36.7 (30 Mar 2019 15:31), Max: 37 (30 Mar 2019 06:16)  T(F): 98 (30 Mar 2019 15:31), Max: 98.6 (30 Mar 2019 06:16)  HR: 79 (30 Mar 2019 15:31) (75 - 105)  BP: 134/69 (30 Mar 2019 15:31) (92/60 - 134/69)  BP(mean): --  RR: 18 (30 Mar 2019 15:31) (17 - 20)  SpO2: 98% (30 Mar 2019 15:31) (96% - 98%)    LABS/DIAGNOSTIC TESTS:                          14.5   40.96 )-----------( 403      ( 30 Mar 2019 04:28 )             42.2     WBC Count: 40.96 K/uL ( @ 04:28)  WBC Count: 43.53 K/uL ( @ 03:46)          133<L>  |  104  |  26<H>  ----------------------------<  198<H>  3.3<L>   |  18<L>  |  3.55<H>    Ca    7.7<L>      30 Mar 2019 03:46    TPro  5.7<L>  /  Alb  2.4<L>  /  TBili  0.4  /  DBili  x   /  AST  15  /  ALT  31  /  AlkPhos  118  30      Urinalysis Basic - ( 30 Mar 2019 09:41 )    Color: Yellow / Appearance: Slightly Turbid / S.015 / pH: x  Gluc: x / Ketone: Negative  / Bili: Small / Urobili: Negative   Blood: x / Protein: 30 mg/dL / Nitrite: Negative   Leuk Esterase: Moderate / RBC: 5-10 /HPF / WBC 26-50 /HPF   Sq Epi: x / Non Sq Epi: Moderate /HPF / Bacteria: Moderate /HPF        LIVER FUNCTIONS - ( 30 Mar 2019 03:46 )  Alb: 2.4 g/dL / Pro: 5.7 g/dL / ALK PHOS: 118 U/L / ALT: 31 U/L DA / AST: 15 U/L / GGT: x                 LACTATE:Lactate, Blood: 1.9 mmol/L ( @ 10:13)  Lactate, Blood: 2.3 mmol/L ( @ 05:00)      ABG -     CULTURES:       RADIOLOGY:      ROS  [  ] UNABLE TO ELICIT HPI:  Pt is a 74F, from home, w/ PMHx of Plaque Psoriasis, and Htn who presents with diffuse rash x 1 days.  Pt states She was recently started on new medication by her GI specialist, Dr. Otoole, for persistent diarrhea- dicyclomine. She took the medication Thursday night and awoke with a diffuse rash Friday morning encompassing the entire body including face, chest, back, arms, legs, and palms, sparing the soles.  She describes it as red and very itchy.  Pt states she recently returned from a trip to [facility] on 3/8 at which point she developed diffuse water diarrhea and abd pain.  On 3/22, Pt went to the ER and syncopized due to severe abd pain.  She underwent cardiac workup which was neg, but diarrhea persisted.  Stool cultures for infectious cause were negative including C.diff.   She was discharged and went to [facility], Dr. Otoole, who prescribed her prednisone, dicyclomine, and BRAT diet, and the diarrhea improved. She states she clinically improved, and presented for the rash.  Pt denies sick contacts.  States that she did not go in the water or pools when in [facility].  She denies consuming unusual foods.  Pt denies GI problems in the past.   Pt denies fever, abd pain, blood tinged stool, N/V, HA, Urinary symptoms, dizziness, weakness, numbness, blurred vision, or slurred speech.    History as above, she has facial swelling and rashes along with rashes all over her body especially her extremities and back, which appears to be an allergic reaction to an antispasmodic vs cholestyramine which she was started on 2-3 days ago. Her diarrhea stopped 3 days ago and was present for almost 20 days, it was watery, large volume and she was going approx 10/day with no blood or mucus in it. She has no fevers but her WBC count is very high.      PAST MEDICAL & SURGICAL HISTORY:  Prediabetes  HTN (hypertension)  No significant past surgical history      cholestyramine (Rash; Urticaria; Hives)  dicyclomine (Rash; Urticaria; Hives)  penicillins (Rash)      Meds:  calamine Lotion 1 Application(s) Topical four times a day  diphenhydrAMINE   Injectable 50 milliGRAM(s) IV Push every 6 hours  famotidine Injectable 20 milliGRAM(s) IV Push daily  heparin  Injectable 5000 Unit(s) SubCutaneous every 8 hours  sodium chloride 0.9%. 1000 milliLiter(s) IV Continuous <Continuous>      SOCIAL HISTORY:  Smoker:  no  ETOH use:  YES, socially    FAMILY HISTORY: not sig      VITALS:  Vital Signs Last 24 Hrs  T(C): 36.7 (30 Mar 2019 15:31), Max: 37 (30 Mar 2019 06:16)  T(F): 98 (30 Mar 2019 15:31), Max: 98.6 (30 Mar 2019 06:16)  HR: 79 (30 Mar 2019 15:31) (75 - 105)  BP: 134/69 (30 Mar 2019 15:31) (92/60 - 134/69)  BP(mean): --  RR: 18 (30 Mar 2019 15:31) (17 - 20)  SpO2: 98% (30 Mar 2019 15:31) (96% - 98%)    LABS/DIAGNOSTIC TESTS:                          14.5   40.96 )-----------( 403      ( 30 Mar 2019 04:28 )             42.2     WBC Count: 40.96 K/uL ( @ 04:28)  WBC Count: 43.53 K/uL ( @ 03:46)          133<L>  |  104  |  26<H>  ----------------------------<  198<H>  3.3<L>   |  18<L>  |  3.55<H>    Ca    7.7<L>      30 Mar 2019 03:46    TPro  5.7<L>  /  Alb  2.4<L>  /  TBili  0.4  /  DBili  x   /  AST  15  /  ALT  31  /  AlkPhos  118        Urinalysis Basic - ( 30 Mar 2019 09:41 )    Color: Yellow / Appearance: Slightly Turbid / S.015 / pH: x  Gluc: x / Ketone: Negative  / Bili: Small / Urobili: Negative   Blood: x / Protein: 30 mg/dL / Nitrite: Negative   Leuk Esterase: Moderate / RBC: 5-10 /HPF / WBC 26-50 /HPF   Sq Epi: x / Non Sq Epi: Moderate /HPF / Bacteria: Moderate /HPF        LIVER FUNCTIONS - ( 30 Mar 2019 03:46 )  Alb: 2.4 g/dL / Pro: 5.7 g/dL / ALK PHOS: 118 U/L / ALT: 31 U/L DA / AST: 15 U/L / GGT: x                 LACTATE:Lactate, Blood: 1.9 mmol/L ( @ 10:13)  Lactate, Blood: 2.3 mmol/L ( @ 05:00)      ABG -     CULTURES:       RADIOLOGY:  < from: Xray Chest 1 View-PORTABLE IMMEDIATE (19 @ 07:07) >  EXAM:  XR CHEST PORTABLE IMMED 1V                            PROCEDURE DATE:  2019          INTERPRETATION:  CLINICAL HISTORY: 74 years  Female with Sepsis.    COMPARISON: 3/22/2019    AP view of the chest demonstrates mild bibasilar discoid atelectasis. The   right hemidiaphragm is mildly elevated.. There is no pleural effusion.   There is no pneumothorax.    The heart is normal in size. There is no mediastinal or hilar mass.     The pulmonary vasculature is normal.     There is mild thoracic levoscoliosis.    IMPRESSION:    Mild bibasilar discoid atelectasis.        -----------------------------------------------------------------------------------------      ROS  [  ] UNABLE TO ELICIT

## 2019-03-30 NOTE — H&P ADULT - PROBLEM SELECTOR PLAN 3
-Diffuse watery diarrhea x 20 days   -Worked up as OP for infectious causes- negative  -Will continue IV hydration for now and replete electrolytes as tolerated   -f/u Stool culture, ova parasite, and C.diff  -Contact Isolation  -GI: Dr. Hart

## 2019-03-30 NOTE — H&P ADULT - PROBLEM SELECTOR PLAN 1
-Leukocytosis, tachycardia and Hypotension on admission- may be 2/2 infectious colitis  -s/p 3L NS in ED, s/o Vanc, Cipro and flagyl for possible colitis   -CXR clear   -Abd x-ray non-specific bowel gas pattern   -Pt was recently on steroid therapy for 2 days which could be contributing to WBC count   -Will hold further Abx as pt clinically improved with hydration and symptoms likely from drug reaction  -will consult ID for further recommendation   -f/u UA  -f/u blood cultures, Urine culture   -ID: Dr. White -Leukocytosis, tachycardia and Hypotension on admission- may be 2/2 infectious colitis  -s/p 3L NS in ED, s/o Vanc, Cipro and flagyl for possible colitis   -CXR clear   -Abd x-ray non-specific bowel gas pattern   -Pt was recently on steroid therapy for 2 days which could be contributing to WBC count   -Will hold further Abx as pt clinically improved with hydration and symptoms likely from drug reaction  -will consult ID for further recommendation   -f/u UA  -f/u blood cultures, Urine culture   -ID: Dr. Bello

## 2019-03-30 NOTE — ED ADULT NURSE NOTE - OBJECTIVE STATEMENT
Patient complain of rash all over body for past 2 days, worsened today  past 21 days had diarrhea,   recent trip to [place]

## 2019-03-30 NOTE — CONSULT NOTE ADULT - RS GEN PE MLT RESP DETAILS PC
good air movement/breath sounds equal/clear to auscultation bilaterally/no rhonchi/no rales/no wheezes

## 2019-03-30 NOTE — H&P ADULT - ASSESSMENT
Pt is a 74F, from home, w/ PMHx of Plaque Psoriasis, and Htn who presents with diffuse rash x 1 days.  In the ED, pt presents in no acute distress, vitals sig for BP of 92/60 and HR of 105, and EKG NSR.  Pt remains afebrile w/ leukocytosis of 40k.  Remaining labs sig for Creat of 3.5, K+ of 3.3 likely from GI losses.  Lactate 2.3.  Diffuse rash likely 2/2 urticarial drug eruption, but will r/o other infectious causes of rash as well as pt presented with sepsis.  Pt also has diarrhea for 20 days which appears to be resolving, but in the presence of sepsis, Infectious causes of diarrhea should be ruled out as well.     Pt will be admitted to medicine for Investigation of diffuse rash and Persistent Diarrhea

## 2019-03-30 NOTE — ED PROVIDER NOTE - CLINICAL SUMMARY MEDICAL DECISION MAKING FREE TEXT BOX
Will obtain basic labs, give allergic reaction cocktail, fluids and reassess. Will obtain basic labs, give allergic reaction cocktail, fluids and reassess.  labs are significnt for WBC 40, JAY. after blod work results sepsis workup ordered. abx ordered. given allergic reaction cocktail. will admit for further workup.

## 2019-03-30 NOTE — ED ADULT NURSE NOTE - ED STAT RN HANDOFF DETAILS 3
received pt.from silviano roberts.pt.is  awake responsive.denies pain. admitted to medicine for leukocytosis

## 2019-03-30 NOTE — H&P ADULT - NSHPPHYSICALEXAM_GEN_ALL_CORE
Vital Signs Last 24 Hrs  T(C): 37 (30 Mar 2019 06:16), Max: 37 (30 Mar 2019 06:16)  T(F): 98.6 (30 Mar 2019 06:16), Max: 98.6 (30 Mar 2019 06:16)  HR: 79 (30 Mar 2019 06:16) (79 - 105)  BP: 121/68 (30 Mar 2019 06:16) (92/60 - 121/68)  RR: 20 (30 Mar 2019 06:16) (20 - 20)  SpO2: 97% (30 Mar 2019 06:16) (97% - 97%)

## 2019-03-30 NOTE — H&P ADULT - PROBLEM SELECTOR PLAN 6
[] Previous VTE                                                3  [] Thrombophilia                                             2  [] Lower limb paralysis                                   2    [] Current Cancer                                             2   [x] Immobilization > 24 hrs                              1  [] ICU/CCU stay > 24 hours                             1  [x] Age > 60                                                         1    IMPROVE VTE Score: Heparin subq for DVT prophy

## 2019-03-30 NOTE — CONSULT NOTE ADULT - GASTROINTESTINAL DETAILS
soft/no guarding/no organomegaly/no rebound tenderness/no rigidity/nontender/no masses palpable/bowel sounds normal/no distention

## 2019-03-30 NOTE — CONSULT NOTE ADULT - ASSESSMENT
Allergic reaction to medication  Colitis resolved  Leukocytosis - likely from steroids, was on prednisone as an outpatient    plan - dc stool studies  dc isolation  dc rocephin  check IGE level  cont iv steroids

## 2019-03-30 NOTE — H&P ADULT - PROBLEM SELECTOR PLAN 4
-Creat of 3.55 likely pre renal from dehydration   -c/w IV hydration  -f/u UA/ Urine lytes   -c/w IV hydration and monitor for improvement  -f/u renal US

## 2019-03-30 NOTE — ED ADULT TRIAGE NOTE - AS TEMP SITE
Spoke with Dr. Fontanez, recommended estrace cream and wrap prolapsed uterus with saran wrap, follow up in her office . Dr. Galan informed of this and orders written.   
oral

## 2019-03-30 NOTE — H&P ADULT - ATTENDING COMMENTS
Patient seen and examined ; case was discussed with the admitting resident    ROS: as in the HPI; all other ROS negative    SH and family history as above    Vital Signs Last 24 Hrs  T(C): 37 (30 Mar 2019 06:16), Max: 37 (30 Mar 2019 06:16)  T(F): 98.6 (30 Mar 2019 06:16), Max: 98.6 (30 Mar 2019 06:16)  HR: 79 (30 Mar 2019 06:16) (79 - 105)  BP: 121/68 (30 Mar 2019 06:16) (92/60 - 121/68)  BP(mean): --  RR: 20 (30 Mar 2019 06:16) (20 - 20)  SpO2: 97% (30 Mar 2019 06:16) (97% - 97%)    GEN: uncomfortable, ill appearing  HEENT- normocephalic; mouth without lesions, tongue hyperemic, no pharyngeal edema, cervical lymphadenopathy, facial edema   CVS- S1S2+  LUNGS- clear to auscultation; no wheezing  ABD: Soft , nontender, nondistended, Bowel sounds are present  EXTREMITY: no calf tenderness, no cyanosis, no edema  NEURO: AAOx3; non focal neurologic exam; cranial nerves grossly intact  PSYCH: normal affect and behavior  BACK: no swelling or mass;   VASCULAR: ++ distal peripheral pulses  SKIN: diffuse urticaria/ morbilliform rash involving palms, abdomen, legs, back with diffuse erythema and some small pustules   Labs Reviewed:                         14.5   40.96 )-----------( 403      ( 30 Mar 2019 04:28 )             42.2     03-30    133<L>  |  104  |  26<H>  ----------------------------<  198<H>  3.3<L>   |  18<L>  |  3.55<H>    Ca    7.7<L>      30 Mar 2019 03:46    TPro  5.7<L>  /  Alb  2.4<L>  /  TBili  0.4  /  DBili  x   /  AST  15  /  ALT  31  /  AlkPhos  118  03-30    MEDICATIONS  (STANDING):  diphenhydrAMINE   Injectable 50 milliGRAM(s) IV Push every 6 hours  famotidine Injectable 20 milliGRAM(s) IV Push daily  potassium chloride    Tablet ER 40 milliEquivalent(s) Oral every 4 hours      CXR reviewed    EKG Reviewed- lateral ST depressions     73 y/o F with HTN, mild psoriasis not on immunosuppressants admitted with severe sepsis and generalized rash after drug exposure. Patient and her  frequently to [facility]. She was asymptomatic while there, did not have any insect or tick bites and her  did not have any symptoms throughout this course. Diarrhea began day after returning, and is described as watery, diffuse, foul smelling. Weight loss of 13 pounds. Was prescribed cipro 3/13 by PCP office, had c dif and o/p which were negative, cipro then discontinued after 4 days around 3/18. Symptoms persisted, had an episode of syncope 2/2 dehydration and went to ED on 3/22 where CT head, neck and abd/pelvis were performed. Was discharged after feeling better, but symptoms persisted, went to GI on 2/28/19 was prescribed cholestyramine, bentyl and prednisone 10mg bid. Woke up 3/29 with severe urticaria on chest that quickly progressed diffusely to facial edema, and all over body except feet.     1. Colitis, chronic diarrhea- malabsorptive suspect. improved now, severe for 3 weeks with 13 pound weight loss, reviewed recent CT with some evidence of colitis. Was in process of setting up colonoscopy as an outpatient. Consider c diff ileus in setting of resolved diarrhea, check abd xray.   2. Severe sepsis- leukocytosis, hypotension, tachycardia, elevated lactic acid. No fever but chills. Suspect 2/2 possible colitis, with ddx including noninfectious causes as well. Empiric abx started, IVF, f/u blood cultures, repeat lactic acid, received 3L bolus with improvement in hemodynamics. Diarrhea resolved now, no obvious localizing source of infection. In setting of suspected drug eruption and possible c diff will defer further abx pending ID recommendations. Has implanted bladder mesh, no hardware.   3. Generalized severe urticarial reaction- presumably to cholestyramine given VANDANA profile and timing, although patient received cipro 2 weeks previously making DRESS a consideration in setting of facial edema and LAD but susepct eosinophils will be low given recent steroid use and hepatic enzymes wnl. ddx includes severe drug eruption vs AGEP vs dress vs pustular psoriasis less likely. H1/H2 blockers, steroids, added medications to allergy profile.   4. JAY- prerenal, possible ATN, TOMI, ua pending, avoid nephrotoxic medications  5. Increased anion gap metabolic acidosis- 2/2 jay, lactic acidosis  6. Hypotonic hyponatremia   7.Hypotension- volume depletion severe dehydration, insensible losses with diffuse rash. Responding to IVF.     Plan of care discussed with patient ;  all questions and concerns were addressed.  Discussed with Patient's family,  at bedside Patient seen and examined ; case was discussed with the admitting resident    ROS: as in the HPI; all other ROS negative    SH and family history as above    Vital Signs Last 24 Hrs  T(C): 37 (30 Mar 2019 06:16), Max: 37 (30 Mar 2019 06:16)  T(F): 98.6 (30 Mar 2019 06:16), Max: 98.6 (30 Mar 2019 06:16)  HR: 79 (30 Mar 2019 06:16) (79 - 105)  BP: 121/68 (30 Mar 2019 06:16) (92/60 - 121/68)  BP(mean): --  RR: 20 (30 Mar 2019 06:16) (20 - 20)  SpO2: 97% (30 Mar 2019 06:16) (97% - 97%)    GEN: uncomfortable, ill appearing  HEENT- normocephalic; mouth without lesions, tongue hyperemic, no pharyngeal edema, cervical lymphadenopathy, facial edema   CVS- S1S2+  LUNGS- clear to auscultation; no wheezing  ABD: Soft , nontender, nondistended, Bowel sounds are present  EXTREMITY: no calf tenderness, no cyanosis, no edema  NEURO: AAOx3; non focal neurologic exam; cranial nerves grossly intact  PSYCH: normal affect and behavior  BACK: no swelling or mass;   VASCULAR: ++ distal peripheral pulses  SKIN: diffuse urticaria/ morbilliform rash involving palms, abdomen, legs, back with diffuse erythema and some small pustules   Labs Reviewed:                         14.5   40.96 )-----------( 403      ( 30 Mar 2019 04:28 )             42.2     03-30    133<L>  |  104  |  26<H>  ----------------------------<  198<H>  3.3<L>   |  18<L>  |  3.55<H>    Ca    7.7<L>      30 Mar 2019 03:46    TPro  5.7<L>  /  Alb  2.4<L>  /  TBili  0.4  /  DBili  x   /  AST  15  /  ALT  31  /  AlkPhos  118  03-30    MEDICATIONS  (STANDING):  diphenhydrAMINE   Injectable 50 milliGRAM(s) IV Push every 6 hours  famotidine Injectable 20 milliGRAM(s) IV Push daily  potassium chloride    Tablet ER 40 milliEquivalent(s) Oral every 4 hours      CXR reviewed    EKG Reviewed- lateral ST depressions     73 y/o F with HTN, mild psoriasis not on immunosuppressants admitted with severe sepsis and generalized rash after drug exposure. Patient and her  frequently to [facility]. She was asymptomatic while there, did not have any insect or tick bites and her  did not have any symptoms throughout this course. Diarrhea began day after returning, and is described as watery, diffuse, foul smelling. Weight loss of 13 pounds. Was prescribed cipro 3/13 by PCP office, had c dif and o/p which were negative, cipro then discontinued after 4 days around 3/18. Symptoms persisted, had an episode of syncope 2/2 dehydration and went to ED on 3/22 where CT head, neck and abd/pelvis were performed. Was discharged after feeling better, but symptoms persisted, went to GI on 2/28/19 was prescribed cholestyramine, bentyl and prednisone 10mg bid. Woke up 3/29 with severe urticaria on chest that quickly progressed diffusely to facial edema, and all over body except feet.     1. Colitis, chronic diarrhea- malabsorptive suspect. improved now, severe for 3 weeks with 13 pound weight loss, reviewed recent CT with some evidence of colitis. Was in process of setting up colonoscopy as an outpatient. Consider c diff ileus in setting of resolved diarrhea, check abd xray.   2. Severe sepsis- leukocytosis, hypotension, tachycardia, elevated lactic acid. No fever but chills. Suspect 2/2 possible colitis, with ddx including noninfectious causes as well. Empiric abx started, IVF, f/u blood cultures, repeat lactic acid, received 3L bolus with improvement in hemodynamics. Diarrhea resolved now, no obvious localizing source of infection. In setting of suspected drug eruption and possible c diff will defer further abx pending ID recommendations. Has implanted bladder mesh, no hardware.   3. Generalized severe urticarial reaction- presumably to cholestyramine given VANDANA profile and timing, although patient received cipro 2 weeks previously making DRESS a consideration in setting of facial edema and LAD but susepct eosinophils will be low given recent steroid use and hepatic enzymes wnl. ddx includes severe drug eruption vs AGEP vs dress vs pustular psoriasis less likely. H1/H2 blockers, steroids, added medications to allergy profile.   4. JAY- prerenal, possible ATN, TOMI, ua pending, avoid nephrotoxic medications  5. Increased anion gap metabolic acidosis- 2/2 jay, lactic acidosis  6. Hypotonic hyponatremia   7.Hypotension- volume depletion severe dehydration, insensible losses with diffuse rash. Responding to IVF.   8. Leukocytosis- on steroids, infectious w/u pending, endometrial thickening on ct previously, no symptoms, follow up outpatient   Plan of care discussed with patient ;  all questions and concerns were addressed.  Discussed with Patient's family,  at bedside

## 2019-03-30 NOTE — CHART NOTE - NSCHARTNOTEFT_GEN_A_CORE
Pt provided OP office documentation from Dr. Lashae Casas on visit dating 3/13.  Documents were misplaced in the ED.  Pt was informed that we will get copy of record faxed from OP office.  Please obtain record from Dr. Casas on Monday.  Thank you

## 2019-03-30 NOTE — H&P ADULT - NEUROLOGICAL DETAILS
sensation intact/cranial nerves intact/alert and oriented x 3/responds to verbal commands/responds to pain

## 2019-03-31 DIAGNOSIS — E87.2 ACIDOSIS: ICD-10-CM

## 2019-03-31 LAB
ANION GAP SERPL CALC-SCNC: 9 MMOL/L — SIGNIFICANT CHANGE UP (ref 5–17)
APPEARANCE UR: ABNORMAL
BILIRUB UR-MCNC: NEGATIVE — SIGNIFICANT CHANGE UP
BUN SERPL-MCNC: 28 MG/DL — HIGH (ref 7–18)
CALCIUM SERPL-MCNC: 7.4 MG/DL — LOW (ref 8.4–10.5)
CHLORIDE SERPL-SCNC: 115 MMOL/L — HIGH (ref 96–108)
CHLORIDE UR-SCNC: 67 MMOL/L — SIGNIFICANT CHANGE UP (ref 55–125)
CO2 SERPL-SCNC: 16 MMOL/L — LOW (ref 22–31)
COLOR SPEC: YELLOW — SIGNIFICANT CHANGE UP
CREAT SERPL-MCNC: 2.19 MG/DL — HIGH (ref 0.5–1.3)
CULTURE RESULTS: SIGNIFICANT CHANGE UP
DIFF PNL FLD: NEGATIVE — SIGNIFICANT CHANGE UP
GLUCOSE SERPL-MCNC: 104 MG/DL — HIGH (ref 70–99)
GLUCOSE UR QL: 250
HCT VFR BLD CALC: 42 % — SIGNIFICANT CHANGE UP (ref 34.5–45)
HGB BLD-MCNC: 13.9 G/DL — SIGNIFICANT CHANGE UP (ref 11.5–15.5)
KETONES UR-MCNC: NEGATIVE — SIGNIFICANT CHANGE UP
LEUKOCYTE ESTERASE UR-ACNC: ABNORMAL
MAGNESIUM SERPL-MCNC: 1.8 MG/DL — SIGNIFICANT CHANGE UP (ref 1.6–2.6)
MCHC RBC-ENTMCNC: 30.6 PG — SIGNIFICANT CHANGE UP (ref 27–34)
MCHC RBC-ENTMCNC: 33.1 GM/DL — SIGNIFICANT CHANGE UP (ref 32–36)
MCV RBC AUTO: 92.5 FL — SIGNIFICANT CHANGE UP (ref 80–100)
NITRITE UR-MCNC: NEGATIVE — SIGNIFICANT CHANGE UP
NRBC # BLD: 0 /100 WBCS — SIGNIFICANT CHANGE UP (ref 0–0)
PH UR: 5 — SIGNIFICANT CHANGE UP (ref 5–8)
PHOSPHATE SERPL-MCNC: 1.3 MG/DL — LOW (ref 2.5–4.5)
PLATELET # BLD AUTO: 359 K/UL — SIGNIFICANT CHANGE UP (ref 150–400)
POTASSIUM SERPL-MCNC: 3.8 MMOL/L — SIGNIFICANT CHANGE UP (ref 3.5–5.3)
POTASSIUM SERPL-SCNC: 3.8 MMOL/L — SIGNIFICANT CHANGE UP (ref 3.5–5.3)
POTASSIUM UR-SCNC: 12 MMOL/L — LOW (ref 25–125)
PROT UR-MCNC: 15
RBC # BLD: 4.54 M/UL — SIGNIFICANT CHANGE UP (ref 3.8–5.2)
RBC # FLD: 14.8 % — HIGH (ref 10.3–14.5)
SODIUM SERPL-SCNC: 140 MMOL/L — SIGNIFICANT CHANGE UP (ref 135–145)
SODIUM UR-SCNC: 20 MMOL/L — LOW (ref 40–220)
SP GR SPEC: 1.01 — SIGNIFICANT CHANGE UP (ref 1.01–1.02)
SPECIMEN SOURCE: SIGNIFICANT CHANGE UP
T PALLIDUM AB TITR SER: NEGATIVE — SIGNIFICANT CHANGE UP
UROBILINOGEN FLD QL: NEGATIVE — SIGNIFICANT CHANGE UP
WBC # BLD: 30.35 K/UL — HIGH (ref 3.8–10.5)
WBC # FLD AUTO: 30.35 K/UL — HIGH (ref 3.8–10.5)

## 2019-03-31 PROCEDURE — 99233 SBSQ HOSP IP/OBS HIGH 50: CPT | Mod: GC

## 2019-03-31 RX ORDER — SODIUM BICARBONATE 1 MEQ/ML
325 SYRINGE (ML) INTRAVENOUS
Qty: 0 | Refills: 0 | Status: COMPLETED | OUTPATIENT
Start: 2019-03-31 | End: 2019-04-02

## 2019-03-31 RX ADMIN — Medication 50 MILLIGRAM(S): at 17:35

## 2019-03-31 RX ADMIN — Medication 325 MILLIGRAM(S): at 17:36

## 2019-03-31 RX ADMIN — Medication 40 MILLIGRAM(S): at 06:37

## 2019-03-31 RX ADMIN — HEPARIN SODIUM 5000 UNIT(S): 5000 INJECTION INTRAVENOUS; SUBCUTANEOUS at 14:33

## 2019-03-31 RX ADMIN — Medication 50 MILLIGRAM(S): at 06:37

## 2019-03-31 RX ADMIN — HEPARIN SODIUM 5000 UNIT(S): 5000 INJECTION INTRAVENOUS; SUBCUTANEOUS at 22:46

## 2019-03-31 RX ADMIN — FAMOTIDINE 20 MILLIGRAM(S): 10 INJECTION INTRAVENOUS at 11:55

## 2019-03-31 RX ADMIN — CALAMINE AND ZINC OXIDE AND PHENOL 1 APPLICATION(S): 160; 10 LOTION TOPICAL at 06:38

## 2019-03-31 RX ADMIN — Medication 40 MILLIGRAM(S): at 14:33

## 2019-03-31 RX ADMIN — CALAMINE AND ZINC OXIDE AND PHENOL 1 APPLICATION(S): 160; 10 LOTION TOPICAL at 23:10

## 2019-03-31 RX ADMIN — Medication 50 MILLIGRAM(S): at 23:10

## 2019-03-31 RX ADMIN — CALAMINE AND ZINC OXIDE AND PHENOL 1 APPLICATION(S): 160; 10 LOTION TOPICAL at 11:53

## 2019-03-31 RX ADMIN — HEPARIN SODIUM 5000 UNIT(S): 5000 INJECTION INTRAVENOUS; SUBCUTANEOUS at 06:37

## 2019-03-31 RX ADMIN — SODIUM CHLORIDE 75 MILLILITER(S): 9 INJECTION INTRAMUSCULAR; INTRAVENOUS; SUBCUTANEOUS at 17:44

## 2019-03-31 RX ADMIN — Medication 50 MILLIGRAM(S): at 11:55

## 2019-03-31 RX ADMIN — Medication 85 MILLIMOLE(S): at 14:32

## 2019-03-31 NOTE — PROGRESS NOTE ADULT - SUBJECTIVE AND OBJECTIVE BOX
Patient is a 74y old  Female who presents with a chief complaint of Diffuse Rash (30 Mar 2019 17:58)    INTERVAL HPI/OVERNIGHT EVENTS:  Patient seen and examined at bedside, feels ok, still has itch   Diarrhea resolved. Labs showing improvement in WBC count and Creatinine  Allergies    cholestyramine (Rash; Urticaria; Hives)  dicyclomine (Rash; Urticaria; Hives)  penicillins (Rash)    Intolerances        REVIEW OF SYSTEMS:  CONSTITUTIONAL: No fever, weight loss, or fatigue  EYES: No eye pain, visual disturbances, or discharge  RESPIRATORY: No cough, wheezing or shortness of breath  CARDIOVASCULAR: No chest pain, feeling of heart beats  GASTROINTESTINAL: No abdominal or epigastric pain. No nausea, vomiting; No diarrhea or constipation.  GENITOURINARY: No dysuria, frequency, hematuria  NEUROLOGICAL: No headaches  MUSCULOSKELETAL: No joint pain  PSYCHIATRIC: No depression or anxiety  HEME/LYMPH: No easy bruising, or bleeding gums  ALLERGY AND IMMUNOLOGIC: No hives or eczema    Medications:  calamine Lotion 1 Application(s) Topical four times a day  diphenhydrAMINE   Injectable 50 milliGRAM(s) IV Push every 6 hours  famotidine Injectable 20 milliGRAM(s) IV Push daily  heparin  Injectable 5000 Unit(s) SubCutaneous every 8 hours  methylPREDNISolone sodium succinate Injectable 40 milliGRAM(s) IV Push daily  sodium chloride 0.9%. 1000 milliLiter(s) IV Continuous <Continuous>      PHYSICAL EXAM:  Vital Signs Last 24 Hrs  T(C): 37.2 (31 Mar 2019 05:11), Max: 37.2 (30 Mar 2019 19:15)  T(F): 98.9 (31 Mar 2019 05:11), Max: 99 (30 Mar 2019 19:15)  HR: 86 (31 Mar 2019 05:11) (79 - 89)  BP: 129/51 (31 Mar 2019 05:11) (126/56 - 134/69)  BP(mean): --  RR: 16 (31 Mar 2019 05:11) (16 - 18)  SpO2: 99% (31 Mar 2019 05:11) (98% - 99%)    GENERAL: NAD  HEAD:  Atraumatic, Normocephalic  EYES: EOMI, PERRLA, conjunctiva and sclera clear  ENT: moist mucous membranes  NECK: Supple, No JVD, Normal thyroid  NERVOUS SYSTEM:  Alert & Oriented X3, Good concentration; Motor Strength 5/5 B/L upper and lower extremities; DTRs 2+ intact and symmetric  CHEST/LUNG: No rales, rhonchi, wheezing, or rubs  HEART: Regular rate and rhythm; No murmurs, rubs, or gallops  ABDOMEN: Soft, Nontender, Nondistended; Bowel sounds present  EXTREMITIES:  2+ Peripheral Pulses, No clubbing, cyanosis, or edema  SKIN: No rashes or lesions    LABS:                        13.9   30.35 )-----------( 359      ( 31 Mar 2019 07:24 )             42.0     03-31    140  |  115<H>  |  28<H>  ----------------------------<  104<H>  3.8   |  16<L>  |  2.19<H>    Ca    7.4<L>      31 Mar 2019 07:24  Phos  1.3     -  Mg     1.8         TPro  5.7<L>  /  Alb  2.4<L>  /  TBili  0.4  /  DBili  x   /  AST  15  /  ALT  31  /  AlkPhos  118  03-30    LIVER FUNCTIONS - ( 30 Mar 2019 03:46 )  Alb: 2.4 g/dL / Pro: 5.7 g/dL / ALK PHOS: 118 U/L / ALT: 31 U/L DA / AST: 15 U/L / GGT: x                   Urinalysis Basic - ( 30 Mar 2019 09:41 )    Color: Yellow / Appearance: Slightly Turbid / S.015 / pH: x  Gluc: x / Ketone: Negative  / Bili: Small / Urobili: Negative   Blood: x / Protein: 30 mg/dL / Nitrite: Negative   Leuk Esterase: Moderate / RBC: 5-10 /HPF / WBC 26-50 /HPF   Sq Epi: x / Non Sq Epi: Moderate /HPF / Bacteria: Moderate /HPF      Culture & Sensitivity:   CAPILLARY BLOOD GLUCOSE            RADIOLOGY & ADDITIONAL TESTS:  Radiology testing independently reviewed:     Consultant(s) Notes Reviewed:  [ x] YES  [ ] NO    Care Discussed with Consultants/Other Providers [ x] YES  [ ] NO

## 2019-04-01 DIAGNOSIS — L29.9 PRURITUS, UNSPECIFIED: ICD-10-CM

## 2019-04-01 DIAGNOSIS — D72.829 ELEVATED WHITE BLOOD CELL COUNT, UNSPECIFIED: ICD-10-CM

## 2019-04-01 DIAGNOSIS — R25.1 TREMOR, UNSPECIFIED: ICD-10-CM

## 2019-04-01 LAB
ANION GAP SERPL CALC-SCNC: 9 MMOL/L — SIGNIFICANT CHANGE UP (ref 5–17)
BUN SERPL-MCNC: 29 MG/DL — HIGH (ref 7–18)
CALCIUM SERPL-MCNC: 7 MG/DL — LOW (ref 8.4–10.5)
CHLORIDE SERPL-SCNC: 114 MMOL/L — HIGH (ref 96–108)
CO2 SERPL-SCNC: 18 MMOL/L — LOW (ref 22–31)
CREAT SERPL-MCNC: 1.77 MG/DL — HIGH (ref 0.5–1.3)
GLUCOSE SERPL-MCNC: 254 MG/DL — HIGH (ref 70–99)
HCT VFR BLD CALC: 34 % — LOW (ref 34.5–45)
HGB BLD-MCNC: 11.4 G/DL — LOW (ref 11.5–15.5)
IGE SERPL-ACNC: 115 IU/ML — SIGNIFICANT CHANGE UP
MAGNESIUM SERPL-MCNC: 1.7 MG/DL — SIGNIFICANT CHANGE UP (ref 1.6–2.6)
MCHC RBC-ENTMCNC: 31.4 PG — SIGNIFICANT CHANGE UP (ref 27–34)
MCHC RBC-ENTMCNC: 33.5 GM/DL — SIGNIFICANT CHANGE UP (ref 32–36)
MCV RBC AUTO: 93.7 FL — SIGNIFICANT CHANGE UP (ref 80–100)
NRBC # BLD: 0 /100 WBCS — SIGNIFICANT CHANGE UP (ref 0–0)
PH UR: 6 — SIGNIFICANT CHANGE UP (ref 5–8)
PHOSPHATE SERPL-MCNC: 2.7 MG/DL — SIGNIFICANT CHANGE UP (ref 2.5–4.5)
PLATELET # BLD AUTO: 335 K/UL — SIGNIFICANT CHANGE UP (ref 150–400)
POTASSIUM SERPL-MCNC: 4.3 MMOL/L — SIGNIFICANT CHANGE UP (ref 3.5–5.3)
POTASSIUM SERPL-SCNC: 4.3 MMOL/L — SIGNIFICANT CHANGE UP (ref 3.5–5.3)
RBC # BLD: 3.63 M/UL — LOW (ref 3.8–5.2)
RBC # FLD: 15 % — HIGH (ref 10.3–14.5)
SODIUM SERPL-SCNC: 141 MMOL/L — SIGNIFICANT CHANGE UP (ref 135–145)
WBC # BLD: 22.5 K/UL — HIGH (ref 3.8–10.5)
WBC # FLD AUTO: 22.5 K/UL — HIGH (ref 3.8–10.5)

## 2019-04-01 PROCEDURE — 99223 1ST HOSP IP/OBS HIGH 75: CPT

## 2019-04-01 PROCEDURE — 99233 SBSQ HOSP IP/OBS HIGH 50: CPT | Mod: GC

## 2019-04-01 RX ORDER — HYDROXYZINE HCL 10 MG
25 TABLET ORAL AT BEDTIME
Qty: 0 | Refills: 0 | Status: DISCONTINUED | OUTPATIENT
Start: 2019-04-01 | End: 2019-04-01

## 2019-04-01 RX ORDER — DIPHENHYDRAMINE HCL/ZINC ACET 2 %-0.1 %
1 CREAM (GRAM) TOPICAL EVERY 12 HOURS
Qty: 0 | Refills: 0 | Status: DISCONTINUED | OUTPATIENT
Start: 2019-04-02 | End: 2019-04-02

## 2019-04-01 RX ORDER — CALCIUM CARBONATE 500(1250)
1 TABLET ORAL DAILY
Qty: 0 | Refills: 0 | Status: DISCONTINUED | OUTPATIENT
Start: 2019-04-01 | End: 2019-04-02

## 2019-04-01 RX ORDER — DIPHENHYDRAMINE HCL 50 MG
50 CAPSULE ORAL EVERY 6 HOURS
Qty: 0 | Refills: 0 | Status: DISCONTINUED | OUTPATIENT
Start: 2019-04-01 | End: 2019-04-02

## 2019-04-01 RX ORDER — PANTOPRAZOLE SODIUM 20 MG/1
40 TABLET, DELAYED RELEASE ORAL
Qty: 0 | Refills: 0 | Status: DISCONTINUED | OUTPATIENT
Start: 2019-04-01 | End: 2019-04-02

## 2019-04-01 RX ORDER — DIPHENHYDRAMINE HCL 50 MG
50 CAPSULE ORAL EVERY 8 HOURS
Qty: 0 | Refills: 0 | Status: DISCONTINUED | OUTPATIENT
Start: 2019-04-01 | End: 2019-04-01

## 2019-04-01 RX ADMIN — Medication 50 MILLIGRAM(S): at 06:35

## 2019-04-01 RX ADMIN — HEPARIN SODIUM 5000 UNIT(S): 5000 INJECTION INTRAVENOUS; SUBCUTANEOUS at 21:25

## 2019-04-01 RX ADMIN — HEPARIN SODIUM 5000 UNIT(S): 5000 INJECTION INTRAVENOUS; SUBCUTANEOUS at 15:12

## 2019-04-01 RX ADMIN — Medication 50 MILLIGRAM(S): at 18:15

## 2019-04-01 RX ADMIN — Medication 50 MILLIGRAM(S): at 23:21

## 2019-04-01 RX ADMIN — CALAMINE AND ZINC OXIDE AND PHENOL 1 APPLICATION(S): 160; 10 LOTION TOPICAL at 06:35

## 2019-04-01 RX ADMIN — CALAMINE AND ZINC OXIDE AND PHENOL 1 APPLICATION(S): 160; 10 LOTION TOPICAL at 23:24

## 2019-04-01 RX ADMIN — HEPARIN SODIUM 5000 UNIT(S): 5000 INJECTION INTRAVENOUS; SUBCUTANEOUS at 06:36

## 2019-04-01 RX ADMIN — SODIUM CHLORIDE 75 MILLILITER(S): 9 INJECTION INTRAMUSCULAR; INTRAVENOUS; SUBCUTANEOUS at 22:56

## 2019-04-01 RX ADMIN — Medication 50 MILLIGRAM(S): at 12:57

## 2019-04-01 RX ADMIN — CALAMINE AND ZINC OXIDE AND PHENOL 1 APPLICATION(S): 160; 10 LOTION TOPICAL at 12:53

## 2019-04-01 RX ADMIN — Medication 325 MILLIGRAM(S): at 06:36

## 2019-04-01 RX ADMIN — Medication 325 MILLIGRAM(S): at 18:16

## 2019-04-01 RX ADMIN — CALAMINE AND ZINC OXIDE AND PHENOL 1 APPLICATION(S): 160; 10 LOTION TOPICAL at 18:17

## 2019-04-01 RX ADMIN — Medication 40 MILLIGRAM(S): at 06:36

## 2019-04-01 RX ADMIN — Medication 1 TABLET(S): at 15:12

## 2019-04-01 NOTE — PROGRESS NOTE ADULT - SUBJECTIVE AND OBJECTIVE BOX
Pt is a 74F, from home, w/ PMH of Plaque Psoriasis, and Htn who presents with diffuse rash x 1 days.  Pt states She was recently started on new medication by her GI specialist, Dr. Otoole, for persistent diarrhea- dicyclomine. She took the medication Thursday night and awoke with a diffuse rash Friday morning encompassing the entire body including face, chest, back, arms, legs, and palms, sparing the soles.  She describes it as red and very itchy.  Pt states she recently returned from a trip to [facility] on 3/8 at which point she developed diffuse water diarrhea and abd pain.  On 3/22, Pt went to the ER and syncopized due to severe abd pain.  She underwent cardiac workup which was neg, but diarrhea persisted.  Stool cultures for infectious cause were negative including C. diff.   She was discharged and went to OP GI, Dr. Otoole, who prescribed her prednisone, dicyclomine, and BRAT diet, and the diarrhea improved. She states she clinically improved, and presented for the rash.  Pt denies sick contacts.  States that she did not go in the water or pools when in [facility].  She denies consuming unusual foods.  Pt denies GI problems in the past.   Pt denies fever, abd pain, blood tinged stool, N/V, HA, Urinary symptoms, dizziness, weakness, numbness, blurred vision, or slurred speech.    Rash Is still present to chest, back, arms and legs. Itching persists as well and it is worse at night  Diarrhea has stopped .            INTERVAL HPI/OVERNIGHT EVENTS: no new complaints    MEDICATIONS  (STANDING):  calamine Lotion 1 Application(s) Topical four times a day  famotidine Injectable 20 milliGRAM(s) IV Push daily  heparin  Injectable 5000 Unit(s) SubCutaneous every 8 hours  predniSONE   Tablet 40 milliGRAM(s) Oral daily  sodium bicarbonate 325 milliGRAM(s) Oral two times a day  sodium chloride 0.9%. 1000 milliLiter(s) (75 mL/Hr) IV Continuous <Continuous>    MEDICATIONS  (PRN):      __________________________________________________  REVIEW OF SYSTEMS:    CONSTITUTIONAL: No fever,   EYES: no acute visual disturbances  NECK: No pain or stiffness  RESPIRATORY: No cough; No shortness of breath  CARDIOVASCULAR: No chest pain, no palpitations  GASTROINTESTINAL: No pain. No nausea or vomiting;  NEUROLOGICAL: No headache or numbness, no tremors  MUSCULOSKELETAL: No joint pain, no muscle pain  GENITOURINARY: no dysuria, no frequency, no hesitancy  PSYCHIATRY: no depression , no anxiety  Skin: Itching and rash present  ALL OTHER  ROS negative        Vital Signs Last 24 Hrs  T(C): 36.9 (2019 05:40), Max: 37.1 (31 Mar 2019 14:43)  T(F): 98.5 (2019 05:40), Max: 98.8 (31 Mar 2019 20:25)  HR: 60 (2019 05:40) (60 - 88)  BP: 131/48 (2019 05:40) (131/48 - 151/59)  BP(mean): --  RR: 16 (2019 05:40) (16 - 18)  SpO2: 97% (2019 05:40) (97% - 98%)    ________________________________________________  PHYSICAL EXAM:  GENERAL: NAD  HEENT: Normocephalic;  conjunctivae and sclerae clear; moist mucous membranes;   NECK : supple  CHEST/LUNG: Clear to auscultation bilaterally with good air entry   HEART: S1 S2  regular;   ABDOMEN: Soft, Nontender, Nondistended; Bowel sounds present  EXTREMITIES: no cyanosis; no edema; no calf tenderness  SKIN: warm and dry; no rash  NERVOUS SYSTEM:  Awake and alert; Oriented  to place, person and time ;    _________________________________________________  LABS:                        11.4   22.50 )-----------( 335      ( 2019 06:40 )             34.0     04-    141  |  114<H>  |  29<H>  ----------------------------<  254<H>  4.3   |  18<L>  |  1.77<H>    Ca    7.0<L>      2019 06:40  Phos  2.7     04-  Mg     1.7     04-        Urinalysis Basic - ( 31 Mar 2019 16:17 )    Color: Yellow / Appearance: Slightly Turbid / S.010 / pH: x  Gluc: x / Ketone: Negative  / Bili: Negative / Urobili: Negative   Blood: x / Protein: 15 / Nitrite: Negative   Leuk Esterase: Moderate / RBC: 2-5 /HPF / WBC 11-25 /HPF   Sq Epi: x / Non Sq Epi: Moderate /HPF / Bacteria: Moderate /HPF      CAPILLARY BLOOD GLUCOSE            RADIOLOGY & ADDITIONAL TESTS:    Imaging Personally Reviewed:  YES  Consultant(s) Notes Reviewed:   YES  Care Discussed with Consultants :     Plan of care was discussed with patient and /or primary care giver; all questions and concerns were addressed and care was aligned with patient's wishes.

## 2019-04-01 NOTE — PROGRESS NOTE ADULT - ATTENDING COMMENTS
Patient seen and examined at bedside, feels ok, daughter and  at her side.  She is still itching, little bit resolving on her belly. Family are concerned about new tremors that is obvious when she moves her hand and against gravity.  Also notes new non productive cough today.  Last time had a BM on Thursday am.   Today her WBC trended down to 22.5, creatinine down to 1.7 from 2.1. Urine culture negative for any UTI  physical exam:  Vital Signs Last 24 Hrs  T(C): 36.6 (01 Apr 2019 14:32), Max: 37.1 (31 Mar 2019 20:25)  T(F): 97.8 (01 Apr 2019 14:32), Max: 98.8 (31 Mar 2019 20:25)  HR: 73 (01 Apr 2019 14:32) (60 - 87)  BP: 153/71 (01 Apr 2019 14:32) (131/48 - 153/71)  BP(mean): --  RR: 18 (01 Apr 2019 14:32) (16 - 18)  SpO2: 98% (01 Apr 2019 14:32) (97% - 98%)  SKIN: red confluent blotches on the torso and LE. On the abdomen is gradually resolving.   Chest: clear  A/P  1- Colitis and diarrhea: abdominal pain resolved, Diarrhea resolved, now it's more of a constipation.   ID input appreciated  2- Allergic reaction and Drug rash: continue Prednisone.   3- Sepsis: resolving  4- JAY: resolving, creatinine decreasing gradually  5- Metabolic acidosis: continue bicarb  UAG is negative, means Metabolic acidosis is secondary to Diarrhea and GI losses  6- HTN: change IV fluids to Half NS  7- Cough: no fever, dry cough  8- Tremor: neurology will evaluate patient.  Most likely it's essential tremor.

## 2019-04-01 NOTE — CONSULT NOTE ADULT - PROBLEM SELECTOR RECOMMENDATION 9
Etiology 2/2 acute renal injury vs. recent medication use Etiology likely toxic/metabolic consider recent infection vs medication side effect Etiology likely toxic/metabolic consider recent infection vs medication side effect, especially dicyclomine    Continue supportive therapy with IV fluids, and continue management for presumed underlying infection    No additional medications or imaging indicated now; tremors are expected to subside as JAY improves and medication levels from the past weekend decline.    If tremors persist for a least 2 weeks, patient may need Neurology clinic follow-up and EMG/NCS in [facility].

## 2019-04-01 NOTE — CONSULT NOTE ADULT - ASSESSMENT
++++++++++++NOTE NOT COMPLETED++++++++++++++++++++++++++++ ++++++++++++NOTE NOT COMPLETED++++++++++++++++++++++++++ 75yo female w/ b/l upper extremity action tremors

## 2019-04-01 NOTE — CONSULT NOTE ADULT - SUBJECTIVE AND OBJECTIVE BOX
++++++++++++++++++NOTE NOT COMPLETED++++++++++++++++++++  Patient is a 74y old  Female who presents with a chief complaint of Diffuse Rash (2019 11:39)      HPI:  Pt is a 74F, from home, w/ PMHx of Plaque Psoriasis, and Htn who presents with diffuse rash x 1 days.  Pt states She was recently started on new medication by her GI specialist, Dr. Otoole, for persistent diarrhea- dicyclomine. She took the medication Thursday night and awoke with a diffuse rash Friday morning encompassing the entire body including face, chest, back, arms, legs, and palms, sparing the soles.  She describes it as red and very itchy.  Pt states she recently returned from a trip to [facility] on 3/8 at which point she developed diffuse water diarrhea and abd pain.  On 3/22, Pt went to the ER and syncopized due to severe abd pain.  She underwent cardiac workup which was neg, but diarrhea persisted.  Stool cultures for infectious cause were negative including C.diff.   She was discharged and went to [facility], Dr. Otoole, who prescribed her prednisone, dicyclomine, and BRAT diet, and the diarrhea improved. She states she clinically improved, and presented for the rash.  Pt denies sick contacts.  States that she did not go in the water or pools when in [facility].  She denies consuming unusual foods.  Pt denies GI problems in the past.   Pt denies fever, abd pain, blood tinged stool, N/V, HA, Urinary symptoms, dizziness, weakness, numbness, blurred vision, or slurred speech.    In the ED, pt presents in no acute distress, vitals sig for BP of 92/60, HR of 105, and EKG NSR (30 Mar 2019 06:48)    At bedside, pt reports that she's had diarrhea x 5 days, that has resolved x 2 days.  Reports resuming oral diet 1 day ago.  States that her dtr noticed her hands shaking while she was eating today.  Pt and  report, yesterday 3/31, pt's pointer finger on left hand froze x 1 min.  Pt states that she had to use her opposite hand (right hand) to wiggle her left pointer finger and unfreeze it.  Pt denies dropping things from her .            Neurological Review of Systems:  No difficulty with language.  No vision loss or double vision.  No dizziness, vertigo or new hearing loss.  No difficulty with speech or swallowing.  No focal weakness.  No focal sensory changes.  No numbness or tingling in the bilateral lower extremities.  No difficulty with balance.  No difficulty with ambulation.        MEDICATIONS  (STANDING):  calamine Lotion 1 Application(s) Topical four times a day  calcium carbonate   1250 mG (OsCal) 1 Tablet(s) Oral daily  heparin  Injectable 5000 Unit(s) SubCutaneous every 8 hours  pantoprazole    Tablet 40 milliGRAM(s) Oral before breakfast  predniSONE   Tablet 40 milliGRAM(s) Oral daily  sodium bicarbonate 325 milliGRAM(s) Oral two times a day  sodium chloride 0.9%. 1000 milliLiter(s) (75 mL/Hr) IV Continuous <Continuous>    MEDICATIONS  (PRN):  diphenhydrAMINE   Injectable 50 milliGRAM(s) IntraMuscular every 6 hours PRN Rash and/or Itching    Allergies    cholestyramine (Rash; Urticaria; Hives)  dicyclomine (Rash; Urticaria; Hives)  penicillins (Rash)    Intolerances      PAST MEDICAL & SURGICAL HISTORY:  Prediabetes  HTN (hypertension)  No significant past surgical history    FAMILY HISTORY:    SOCIAL HISTORY: non smoker    Review of Systems:  Constitutional: No generalized weakness. No fevers or chills                   Eyes, Ears, Mouth, Throat: No vision loss   Respiratory: No shortness of breath or cough                               Cardiovascular: No chest pain or palpitations  Gastrointestinal: No nausea or vomiting                                        Genitourinary: No urinary incontinence or burning on urination  Musculoskeletal: No joint pain                                                           Dermatologic: No rash  Neurological: as per HPI                                                                      Psychiatric: No behavioral problems  Endocrine: No known hypoglycemia              Hematologic/Lymphatic: No easy bleeding    O:  Vital Signs Last 24 Hrs  T(C): 36.6 (2019 14:32), Max: 37.1 (31 Mar 2019 20:25)  T(F): 97.8 (2019 14:32), Max: 98.8 (31 Mar 2019 20:25)  HR: 73 (2019 14:32) (60 - 87)  BP: 153/71 (2019 14:32) (131/48 - 153/71)  RR: 18 (2019 14:32) (16 - 18)  SpO2: 98% (2019 14:32) (97% - 98%)    General Exam:   General appearance: No acute distress                 Cardiovascular: Pedal dorsalis pulses intact bilaterally    Mental Status: Orientated to self, date and place.  Attention intact.  No dysarthria, aphasia or neglect.  Knowledge intact.  Registration intact.  Short and long term memory grossly intact.      Cranial Nerves: CN I - not tested.  PERRL, EOMI, VFF, no nystagmus or diplopia.  No APD.  Fundi not visualized.  CN V1-3 intact to light touch and pinprick.  No facial asymmetry.  Hearing intact to finger rub bilaterally.  Tongue, uvula and palate midline.  Sternocleidomastoid and Trapezius intact bilaterally.    Motor:   Tone: Normal and normal bulk                  Strength intact 5/5 throughout  No pronator drift bilaterally                      No dysmetria on finger-nose-finger or heel-shin-heel  No truncal ataxia.  No resting, postural.  (+) action tremor.  No myoclonus.    Sensation: intact to light touch and pinprick     Deep Tendon Reflexes: 2+ bilateral biceps, triceps, brachioradialis, knee and ankle  Toes flexor bilaterally    Gait: normal and stable.  Rhomberg -zuleika.    Other:     LABS:                        11.4   22.50 )-----------( 335      ( 2019 06:40 )             34.0     04-    141  |  114<H>  |  29<H>  ----------------------------<  254<H>  4.3   |  18<L>  |  1.77<H>    Ca    7.0<L>      2019 06:40  Phos  2.7     04-  Mg     1.7     04-        Urinalysis Basic - ( 31 Mar 2019 16:17 )    Color: Yellow / Appearance: Slightly Turbid / S.010 / pH: x  Gluc: x / Ketone: Negative  / Bili: Negative / Urobili: Negative   Blood: x / Protein: 15 / Nitrite: Negative   Leuk Esterase: Moderate / RBC: 2-5 /HPF / WBC 11-25 /HPF   Sq Epi: x / Non Sq Epi: Moderate /HPF / Bacteria: Moderate /HPF          RADIOLOGY & ADDITIONAL STUDIES: ++++++++++++++++++NOTE NOT COMPLETED++++++++++++++++++++  Patient is a 74y old  Female who presents with a chief complaint of Diffuse Rash (2019 11:39)      HPI:  Pt is a 74F, from home, w/ PMHx of Plaque Psoriasis, and Htn who presents with diffuse rash x 1 days.  Pt states She was recently started on new medication by her GI specialist, Dr. Otoole, for persistent diarrhea- dicyclomine. She took the medication Thursday night and awoke with a diffuse rash Friday morning encompassing the entire body including face, chest, back, arms, legs, and palms, sparing the soles.  She describes it as red and very itchy.  Pt states she recently returned from a trip to [facility] on 3/8 at which point she developed diffuse water diarrhea and abd pain.  On 3/22, Pt went to the ER and syncopized due to severe abd pain.  She underwent cardiac workup which was neg, but diarrhea persisted.  Stool cultures for infectious cause were negative including C.diff.   She was discharged and went to [facility], Dr. Otoole, who prescribed her prednisone, dicyclomine, and BRAT diet, and the diarrhea improved. She states she clinically improved, and presented for the rash.  Pt denies sick contacts.  States that she did not go in the water or pools when in [facility].  She denies consuming unusual foods.  Pt denies GI problems in the past.   Pt denies fever, abd pain, blood tinged stool, N/V, HA, Urinary symptoms, dizziness, weakness, numbness, blurred vision, or slurred speech.    In the ED, pt presents in no acute distress, vitals sig for BP of 92/60, HR of 105, and EKG NSR (30 Mar 2019 06:48)    At bedside, pt reports Cholestyramine & Dicyclomine "started this whole thing."  Pt reports that she had diarrhea 5 days ago but has resolved x 2 days.  Reports resuming oral diet 1 day ago.  States that her dtr noticed her hands shaking while she was eating today.  Pt and  report, yesterday 3/31, pt's pointer finger on left hand froze x 1 min.  Pt states that she had to use her opposite hand (right hand) to wiggle her left pointer finger and unfreeze it.  Pt denies dropping things from her .   Per pt her home meds: Felodipine 5mg, Olmesartan medoxomil 40mg, Metoprolol succinate 100mg.  Pt reports that she stopped taking her blood pressure medications x 3 wks.        Neurological Review of Systems:  No difficulty with language.  No vision loss or double vision.  No dizziness, vertigo or new hearing loss.  No difficulty with speech or swallowing.  No focal weakness.  No focal sensory changes.  No numbness or tingling in the bilateral lower extremities.  No difficulty with balance.  No difficulty with ambulation.        MEDICATIONS  (STANDING):  calamine Lotion 1 Application(s) Topical four times a day  calcium carbonate   1250 mG (OsCal) 1 Tablet(s) Oral daily  heparin  Injectable 5000 Unit(s) SubCutaneous every 8 hours  pantoprazole    Tablet 40 milliGRAM(s) Oral before breakfast  predniSONE   Tablet 40 milliGRAM(s) Oral daily  sodium bicarbonate 325 milliGRAM(s) Oral two times a day  sodium chloride 0.9%. 1000 milliLiter(s) (75 mL/Hr) IV Continuous <Continuous>    MEDICATIONS  (PRN):  diphenhydrAMINE   Injectable 50 milliGRAM(s) IntraMuscular every 6 hours PRN Rash and/or Itching    Allergies    cholestyramine (Rash; Urticaria; Hives)  dicyclomine (Rash; Urticaria; Hives)  penicillins (Rash)    Intolerances      PAST MEDICAL & SURGICAL HISTORY:  Prediabetes  HTN (hypertension)  No significant past surgical history    FAMILY HISTORY:    SOCIAL HISTORY: non smoker    Review of Systems:  Constitutional: No generalized weakness. No fevers or chills                   Eyes, Ears, Mouth, Throat: No vision loss   Respiratory: No shortness of breath or cough                               Cardiovascular: No chest pain or palpitations  Gastrointestinal: No nausea or vomiting                                        Genitourinary: No urinary incontinence or burning on urination  Musculoskeletal: No joint pain                                                           Dermatologic: No rash  Neurological: as per HPI                                                                      Psychiatric: No behavioral problems  Endocrine: No known hypoglycemia              Hematologic/Lymphatic: No easy bleeding    O:  Vital Signs Last 24 Hrs  T(C): 36.6 (2019 14:32), Max: 37.1 (31 Mar 2019 20:25)  T(F): 97.8 (2019 14:32), Max: 98.8 (31 Mar 2019 20:25)  HR: 73 (2019 14:32) (60 - 87)  BP: 153/71 (2019 14:32) (131/48 - 153/71)  RR: 18 (2019 14:32) (16 - 18)  SpO2: 98% (2019 14:32) (97% - 98%)    General Exam:   General appearance: No acute distress                 Cardiovascular: Pedal dorsalis pulses intact bilaterally    Mental Status: Orientated to self, date and place.  Attention intact.  No dysarthria, aphasia or neglect.  Knowledge intact.  Registration intact.  Short and long term memory grossly intact.      Cranial Nerves: CN I - not tested.  PERRL, EOMI, VFF, no nystagmus or diplopia.  No APD.  Fundi not visualized.  CN V1-3 intact to light touch and pinprick.  No facial asymmetry.  Hearing intact to finger rub bilaterally.  Tongue, uvula and palate midline.  Sternocleidomastoid and Trapezius intact bilaterally.    Motor:   Tone: Normal and normal bulk                  Strength intact 5/5 throughout  No pronator drift bilaterally                      No dysmetria on finger-nose-finger or heel-shin-heel  No truncal ataxia.  No resting, postural.  (+) action tremor.  No myoclonus.    Sensation: intact to light touch and pinprick     Deep Tendon Reflexes: 2+ bilateral biceps, triceps, brachioradialis, knee and ankle  Toes flexor bilaterally    Gait: normal and stable.  Rhomberg -zuleika.    Other:     LABS:                        11.4   22.50 )-----------( 335      ( 2019 06:40 )             34.0     04-    141  |  114<H>  |  29<H>  ----------------------------<  254<H>  4.3   |  18<L>  |  1.77<H>    Ca    7.0<L>      2019 06:40  Phos  2.7     04-  Mg     1.7     04-        Urinalysis Basic - ( 31 Mar 2019 16:17 )    Color: Yellow / Appearance: Slightly Turbid / S.010 / pH: x  Gluc: x / Ketone: Negative  / Bili: Negative / Urobili: Negative   Blood: x / Protein: 15 / Nitrite: Negative   Leuk Esterase: Moderate / RBC: 2-5 /HPF / WBC 11-25 /HPF   Sq Epi: x / Non Sq Epi: Moderate /HPF / Bacteria: Moderate /HPF          RADIOLOGY & ADDITIONAL STUDIES: Patient is a 74y old  Female who presents with a chief complaint of Diffuse Rash (2019 11:39)      HPI:  Pt is a 74F, from home, w/ PMHx of Plaque Psoriasis, and Htn who presents with diffuse rash x 1 days.  Pt states She was recently started on new medication by her GI specialist, Dr. Otoole, for persistent diarrhea- dicyclomine. She took the medication Thursday night and awoke with a diffuse rash Friday morning encompassing the entire body including face, chest, back, arms, legs, and palms, sparing the soles.  She describes it as red and very itchy.  Pt states she recently returned from a trip to [facility] on 3/8 at which point she developed diffuse water diarrhea and abd pain.  On 3/22, Pt went to the ER and syncopized due to severe abd pain.  She underwent cardiac workup which was neg, but diarrhea persisted.  Stool cultures for infectious cause were negative including C.diff.   She was discharged and went to [facility], Dr. Otoole, who prescribed her prednisone, dicyclomine, and BRAT diet, and the diarrhea improved. She states she clinically improved, and presented for the rash.  Pt denies sick contacts.  States that she did not go in the water or pools when in [facility].  She denies consuming unusual foods.  Pt denies GI problems in the past.   Pt denies fever, abd pain, blood tinged stool, N/V, HA, Urinary symptoms, dizziness, weakness, numbness, blurred vision, or slurred speech.    In the ED, pt presents in no acute distress, vitals sig for BP of 92/60, HR of 105, and EKG NSR (30 Mar 2019 06:48)    At bedside, pt reports Cholestyramine & Dicyclomine "started this whole thing."  Pt reports that she had diarrhea 5 days ago but has resolved x 2 days.  Reports resuming oral diet 1 day ago.  States that her dtr noticed her hands shaking while she was eating today.  Pt and  report, yesterday 3/31, pt's pointer finger on left hand froze x 1 min.  Pt states that she had to use her opposite hand (right hand) to wiggle her left pointer finger and unfreeze it.  Pt denies dropping things from her .   Per pt her home meds: Felodipine 5mg, Olmesartan medoxomil 40mg, Metoprolol succinate 100mg.  Pt reports that she stopped taking her blood pressure medications x 3 wks.        Neurological Review of Systems:  No difficulty with language.  No vision loss or double vision.  No dizziness, vertigo or new hearing loss.  No difficulty with speech or swallowing.  No focal weakness.  No focal sensory changes.  No numbness or tingling in the bilateral lower extremities.  No difficulty with balance.  No difficulty with ambulation.        MEDICATIONS  (STANDING):  calamine Lotion 1 Application(s) Topical four times a day  calcium carbonate   1250 mG (OsCal) 1 Tablet(s) Oral daily  heparin  Injectable 5000 Unit(s) SubCutaneous every 8 hours  pantoprazole    Tablet 40 milliGRAM(s) Oral before breakfast  predniSONE   Tablet 40 milliGRAM(s) Oral daily  sodium bicarbonate 325 milliGRAM(s) Oral two times a day  sodium chloride 0.9%. 1000 milliLiter(s) (75 mL/Hr) IV Continuous <Continuous>    MEDICATIONS  (PRN):  diphenhydrAMINE   Injectable 50 milliGRAM(s) IntraMuscular every 6 hours PRN Rash and/or Itching    Allergies    cholestyramine (Rash; Urticaria; Hives)  dicyclomine (Rash; Urticaria; Hives)  penicillins (Rash)    Intolerances      PAST MEDICAL & SURGICAL HISTORY:  Prediabetes  HTN (hypertension)  No significant past surgical history    FAMILY HISTORY:    SOCIAL HISTORY: non smoker    Review of Systems:  Constitutional: No generalized weakness. No fevers or chills                   Eyes, Ears, Mouth, Throat: No vision loss   Respiratory: No shortness of breath or cough                               Cardiovascular: No chest pain or palpitations  Gastrointestinal: No nausea or vomiting                                        Genitourinary: No urinary incontinence or burning on urination  Musculoskeletal: No joint pain                                                           Dermatologic: No rash  Neurological: as per HPI                                                                      Psychiatric: No behavioral problems  Endocrine: No known hypoglycemia              Hematologic/Lymphatic: No easy bleeding    O:  Vital Signs Last 24 Hrs  T(C): 36.6 (2019 14:32), Max: 37.1 (31 Mar 2019 20:25)  T(F): 97.8 (2019 14:32), Max: 98.8 (31 Mar 2019 20:25)  HR: 73 (2019 14:32) (60 - 87)  BP: 153/71 (2019 14:32) (131/48 - 153/71)  RR: 18 (2019 14:32) (16 - 18)  SpO2: 98% (2019 14:32) (97% - 98%)    General Exam:   General appearance: No acute distress                 Cardiovascular: Pedal dorsalis pulses intact bilaterally    Mental Status: Orientated to self, date and place.  Attention intact.  No dysarthria, aphasia or neglect.  Knowledge intact.  Registration intact.  Short and long term memory grossly intact.      Cranial Nerves: CN I - not tested.  PERRL, EOMI, VFF, no nystagmus or diplopia.  No APD.  Fundi not visualized.  CN V1-3 intact to light touch and pinprick.  No facial asymmetry.  Hearing intact to finger rub bilaterally.  Tongue, uvula and palate midline.  Sternocleidomastoid and Trapezius intact bilaterally.    Motor:   Tone: Normal and normal bulk                  Strength intact 5/5 throughout  No pronator drift bilaterally                      No dysmetria on finger-nose-finger or heel-shin-heel  No truncal ataxia.  No resting, postural.  (+) action tremor.  No myoclonus.    Sensation: intact to light touch and pinprick     Deep Tendon Reflexes: 2+ bilateral biceps, triceps, brachioradialis, knee and ankle  Toes flexor bilaterally    Gait: normal and stable.  Rhomberg -zuleika.    Other:     LABS:                        11.4   22.50 )-----------( 335      ( 2019 06:40 )             34.0     04-    141  |  114<H>  |  29<H>  ----------------------------<  254<H>  4.3   |  18<L>  |  1.77<H>    Ca    7.0<L>      2019 06:40  Phos  2.7     04-  Mg     1.7     04-        Urinalysis Basic - ( 31 Mar 2019 16:17 )    Color: Yellow / Appearance: Slightly Turbid / S.010 / pH: x  Gluc: x / Ketone: Negative  / Bili: Negative / Urobili: Negative   Blood: x / Protein: 15 / Nitrite: Negative   Leuk Esterase: Moderate / RBC: 2-5 /HPF / WBC 11-25 /HPF   Sq Epi: x / Non Sq Epi: Moderate /HPF / Bacteria: Moderate /HPF          RADIOLOGY & ADDITIONAL STUDIES: Patient is a 74y old  Female who presents with a chief complaint of Diffuse Rash (2019 11:39)      HPI:  Pt is a 74F, from home, w/ PMHx of Plaque Psoriasis, and Htn who presents with diffuse rash x 1 days.  Pt states She was recently started on new medication by her GI specialist, Dr. Otoole, for persistent diarrhea- dicyclomine. She took the medication Thursday night and awoke with a diffuse rash Friday morning encompassing the entire body including face, chest, back, arms, legs, and palms, sparing the soles.  She describes it as red and very itchy.  Pt states she recently returned from a trip to [facility] on 3/8 at which point she developed diffuse water diarrhea and abd pain.  On 3/22, Pt went to the ER and syncopized due to severe abd pain.  She underwent cardiac workup which was neg, but diarrhea persisted.  Stool cultures for infectious cause were negative including C.diff.   She was discharged and went to [facility], Dr. Otoole, who prescribed her prednisone, dicyclomine, and BRAT diet, and the diarrhea improved. She states she clinically improved, and presented for the rash.  Pt denies sick contacts.  States that she did not go in the water or pools when in [facility].  She denies consuming unusual foods.  Pt denies GI problems in the past.   Pt denies fever, abd pain, blood tinged stool, N/V, HA, Urinary symptoms, dizziness, weakness, numbness, blurred vision, or slurred speech.    In the ED, pt presents in no acute distress, vitals sig for BP of 92/60, HR of 105, and EKG NSR (30 Mar 2019 06:48)    At bedside, pt reports Cholestyramine & Dicyclomine "started this whole thing."  Pt reports that she had diarrhea 5 days ago but has resolved x 2 days.  Reports resuming oral diet 1 day ago.  States that her dtr noticed her hands shaking while she was eating today.  Pt and  report, yesterday 3/31, pt's pointer finger on left hand froze x 1 min.  Pt states that she had to use her opposite hand (right hand) to wiggle her left pointer finger and unfreeze it.  Pt denies dropping things from her .   Per pt her home meds: Felodipine 5mg, Olmesartan medoxomil 40mg, Metoprolol succinate 100mg.  Pt reports that she stopped taking her blood pressure medications x 3 wks.  Pt reports recent return from [facility].      Neurological Review of Systems:  No difficulty with language.  No vision loss or double vision.  No dizziness, vertigo or new hearing loss.  No difficulty with speech or swallowing.  No focal weakness.  No focal sensory changes.  No numbness or tingling in the bilateral lower extremities.  No difficulty with balance.  No difficulty with ambulation.        MEDICATIONS  (STANDING):  calamine Lotion 1 Application(s) Topical four times a day  calcium carbonate   1250 mG (OsCal) 1 Tablet(s) Oral daily  heparin  Injectable 5000 Unit(s) SubCutaneous every 8 hours  pantoprazole    Tablet 40 milliGRAM(s) Oral before breakfast  predniSONE   Tablet 40 milliGRAM(s) Oral daily  sodium bicarbonate 325 milliGRAM(s) Oral two times a day  sodium chloride 0.9%. 1000 milliLiter(s) (75 mL/Hr) IV Continuous <Continuous>    MEDICATIONS  (PRN):  diphenhydrAMINE   Injectable 50 milliGRAM(s) IntraMuscular every 6 hours PRN Rash and/or Itching    Allergies    cholestyramine (Rash; Urticaria; Hives)  dicyclomine (Rash; Urticaria; Hives)  penicillins (Rash)    Intolerances      PAST MEDICAL & SURGICAL HISTORY:  Prediabetes  HTN (hypertension)  No significant past surgical history    FAMILY HISTORY:    SOCIAL HISTORY: non smoker    Review of Systems:  Constitutional: No generalized weakness. No fevers or chills                   Eyes, Ears, Mouth, Throat: No vision loss   Respiratory: No shortness of breath or cough                               Cardiovascular: No chest pain or palpitations  Gastrointestinal: No nausea or vomiting                                        Genitourinary: No urinary incontinence or burning on urination  Musculoskeletal: No joint pain                                                           Dermatologic: No rash  Neurological: as per HPI                                                                      Psychiatric: No behavioral problems  Endocrine: No known hypoglycemia              Hematologic/Lymphatic: No easy bleeding    O:  Vital Signs Last 24 Hrs  T(C): 36.6 (2019 14:32), Max: 37.1 (31 Mar 2019 20:25)  T(F): 97.8 (2019 14:32), Max: 98.8 (31 Mar 2019 20:25)  HR: 73 (2019 14:32) (60 - 87)  BP: 153/71 (2019 14:32) (131/48 - 153/71)  RR: 18 (2019 14:32) (16 - 18)  SpO2: 98% (2019 14:32) (97% - 98%)    General Exam:   General appearance: No acute distress                 Cardiovascular: Pedal dorsalis pulses intact bilaterally    Mental Status: Orientated to self, date and place.  Attention intact.  No dysarthria, aphasia or neglect.  Knowledge intact.  Registration intact.  Short and long term memory grossly intact.      Cranial Nerves: CN I - not tested.  PERRL, EOMI, VFF, no nystagmus or diplopia.  No APD.  Fundi not visualized.  CN V1-3 intact to light touch and pinprick.  No facial asymmetry.  Hearing intact to finger rub bilaterally.  Tongue, uvula and palate midline.  Sternocleidomastoid and Trapezius intact bilaterally.    Motor:   Tone: Normal and normal bulk                  Strength intact 5/5 throughout  No pronator drift bilaterally                      No dysmetria on finger-nose-finger or heel-shin-heel  No truncal ataxia.  No resting, postural.  (+) action tremor.  No myoclonus.    Sensation: intact to light touch and pinprick     Deep Tendon Reflexes: 2+ bilateral biceps, triceps, brachioradialis, knee and ankle  Toes flexor bilaterally    Gait: normal and stable.  Rhomberg -zuleika.    Other:     LABS:                        11.4   22.50 )-----------( 335      ( 2019 06:40 )             34.0     04-01    141  |  114<H>  |  29<H>  ----------------------------<  254<H>  4.3   |  18<L>  |  1.77<H>    Ca    7.0<L>      2019 06:40  Phos  2.7     04-  Mg     1.7     04-        Urinalysis Basic - ( 31 Mar 2019 16:17 )    Color: Yellow / Appearance: Slightly Turbid / S.010 / pH: x  Gluc: x / Ketone: Negative  / Bili: Negative / Urobili: Negative   Blood: x / Protein: 15 / Nitrite: Negative   Leuk Esterase: Moderate / RBC: 2-5 /HPF / WBC 11-25 /HPF   Sq Epi: x / Non Sq Epi: Moderate /HPF / Bacteria: Moderate /HPF          RADIOLOGY & ADDITIONAL STUDIES: Patient is a 74y old  Female who presents with a chief complaint of Diffuse Rash (2019 11:39)    HPI:  Pt is a 74F, from home, w/ PMHx of Plaque Psoriasis, and Htn who presents with diffuse rash x 1 days.  Pt states She was recently started on new medication by her GI specialist, Dr. Otoole, for persistent diarrhea- dicyclomine. She took the medication Thursday night and awoke with a diffuse rash Friday morning encompassing the entire body including face, chest, back, arms, legs, and palms, sparing the soles.  She describes it as red and very itchy.  Pt states she recently returned from a trip to [facility] on 3/8 at which point she developed diffuse water diarrhea and abd pain.  On 3/22, Pt went to the ER and syncopized due to severe abd pain.  She underwent cardiac workup which was neg, but diarrhea persisted.  Stool cultures for infectious cause were negative including C.diff.   She was discharged and went to [facility], Dr. Otoole, who prescribed her prednisone, dicyclomine, and BRAT diet, and the diarrhea improved. She states she clinically improved, and presented for the rash.  Pt denies sick contacts.  States that she did not go in the water or pools when in [facility].  She denies consuming unusual foods.  Pt denies GI problems in the past.   Pt denies fever, abd pain, blood tinged stool, N/V, HA, Urinary symptoms, dizziness, weakness, numbness, blurred vision, or slurred speech.    In the ED, pt presents in no acute distress, vitals sig for BP of 92/60, HR of 105, and EKG NSR (30 Mar 2019 06:48)    At bedside, pt reports Cholestyramine & Dicyclomine "started this whole thing."  Pt reports that she had diarrhea 5 days ago but has resolved x 2 days.  Reports resuming oral diet 1 day ago.  States that her dtr noticed her hands shaking while she was eating today.  Pt and  report, yesterday 3/31, pt's pointer finger on left hand froze x 1 min.  Pt states that she had to use her opposite hand (right hand) to wiggle her left pointer finger and unfreeze it.  Pt denies dropping things from her .   Per pt her home meds: Felodipine 5mg, Olmesartan medoxomil 40mg, Metoprolol succinate 100mg.  Pt reports that she stopped taking her blood pressure medications x 3 wks.  Pt reports recent return from [facility].      Neurological Review of Systems:  No difficulty with language.  No vision loss or double vision.  No dizziness, vertigo or new hearing loss.  No difficulty with speech or swallowing.  No focal weakness.  No focal sensory changes.  No numbness or tingling in the bilateral lower extremities.  No difficulty with balance.  No difficulty with ambulation.        MEDICATIONS  (STANDING):  calamine Lotion 1 Application(s) Topical four times a day  calcium carbonate   1250 mG (OsCal) 1 Tablet(s) Oral daily  heparin  Injectable 5000 Unit(s) SubCutaneous every 8 hours  pantoprazole    Tablet 40 milliGRAM(s) Oral before breakfast  predniSONE   Tablet 40 milliGRAM(s) Oral daily  sodium bicarbonate 325 milliGRAM(s) Oral two times a day  sodium chloride 0.9%. 1000 milliLiter(s) (75 mL/Hr) IV Continuous <Continuous>    MEDICATIONS  (PRN):  diphenhydrAMINE   Injectable 50 milliGRAM(s) IntraMuscular every 6 hours PRN Rash and/or Itching    Allergies    cholestyramine (Rash; Urticaria; Hives)  dicyclomine (Rash; Urticaria; Hives)  penicillins (Rash)    PAST MEDICAL & SURGICAL HISTORY:  Prediabetes  HTN (hypertension)  No significant past surgical history    FAMILY HISTORY: noncontributory    SOCIAL HISTORY: non smoker    Review of Systems:  Constitutional: No generalized weakness. No fevers or chills                   Eyes, Ears, Mouth, Throat: No vision loss   Respiratory: No shortness of breath or cough                               Cardiovascular: No chest pain or palpitations  Gastrointestinal: No nausea or vomiting                                        Genitourinary: No urinary incontinence or burning on urination  Musculoskeletal: No joint pain                                                           Dermatologic: No rash  Neurological: as per HPI                                                                      Psychiatric: No behavioral problems  Endocrine: No known hypoglycemia              Hematologic/Lymphatic: No easy bleeding      O:  Vital Signs Last 24 Hrs  T(C): 36.6 (2019 14:32), Max: 37.1 (31 Mar 2019 20:25)  T(F): 97.8 (2019 14:32), Max: 98.8 (31 Mar 2019 20:25)  HR: 73 (2019 14:32) (60 - 87)  BP: 153/71 (2019 14:32) (131/48 - 153/71)  RR: 18 (2019 14:32) (16 - 18)  SpO2: 98% (2019 14:32) (97% - 98%)    General Exam:   General appearance: No acute distress                 Cardiovascular: Pedal dorsalis pulses intact bilaterally    Mental Status: Orientated to self, date and place.  Attention intact.  No dysarthria, aphasia or neglect.  Knowledge intact.  Registration intact.  Short and long term memory grossly intact.      Cranial Nerves: CN I - not tested.  PERRL, EOMI, VFF, no nystagmus or diplopia.  No APD.  Fundi not visualized.  CN V1-3 intact to light touch and pinprick.  No facial asymmetry.  Hearing intact to finger rub bilaterally.  Tongue, uvula and palate midline.  Sternocleidomastoid and Trapezius intact with 5/5 strength bilaterally, although with mild head tremor on b/l neck movement against resistance.    Motor:   Tone: Normal and normal bulk                  Strength intact 5/5 throughout  No pronator drift bilaterally                      No dysmetria on finger-nose-finger or heel-shin-heel  No truncal ataxia.  No resting, postural.  (+) action tremor.  No myoclonus.    Sensation: intact to light touch and pinprick     Deep Tendon Reflexes: 2+ bilateral biceps, triceps, brachioradialis, knee and ankle  Toes flexor bilaterally    Gait: normal and stable.  Romberg -zuleika.      Other:     LABS:                        11.4   22.50 )-----------( 335      ( 2019 06:40 )             34.0     04-    141  |  114<H>  |  29<H>  ----------------------------<  254<H>  4.3   |  18<L>  |  1.77<H>    Ca    7.0<L>      2019 06:40  Phos  2.7     04-  Mg     1.7     -        Urinalysis Basic - ( 31 Mar 2019 16:17 )    Color: Yellow / Appearance: Slightly Turbid / S.010 / pH: x  Gluc: x / Ketone: Negative  / Bili: Negative / Urobili: Negative   Blood: x / Protein: 15 / Nitrite: Negative   Leuk Esterase: Moderate / RBC: 2-5 /HPF / WBC 11-25 /HPF   Sq Epi: x / Non Sq Epi: Moderate /HPF / Bacteria: Moderate /HPF

## 2019-04-02 ENCOUNTER — TRANSCRIPTION ENCOUNTER (OUTPATIENT)
Age: 74
End: 2019-04-02

## 2019-04-02 VITALS
DIASTOLIC BLOOD PRESSURE: 46 MMHG | SYSTOLIC BLOOD PRESSURE: 124 MMHG | RESPIRATION RATE: 16 BRPM | OXYGEN SATURATION: 99 % | TEMPERATURE: 98 F | HEART RATE: 61 BPM

## 2019-04-02 LAB
ANION GAP SERPL CALC-SCNC: 9 MMOL/L — SIGNIFICANT CHANGE UP (ref 5–17)
B19V IGG SER-ACNC: 3.4 INDEX — HIGH (ref 0–0.8)
B19V IGG+IGM SER-IMP: POSITIVE
B19V IGG+IGM SER-IMP: SIGNIFICANT CHANGE UP
B19V IGM FLD-ACNC: 0.4 — SIGNIFICANT CHANGE UP
B19V IGM SER-ACNC: NEGATIVE — SIGNIFICANT CHANGE UP
BUN SERPL-MCNC: 28 MG/DL — HIGH (ref 7–18)
CALCIUM SERPL-MCNC: 7.5 MG/DL — LOW (ref 8.4–10.5)
CHLORIDE SERPL-SCNC: 112 MMOL/L — HIGH (ref 96–108)
CHLORIDE UR-SCNC: 80 MMOL/L — SIGNIFICANT CHANGE UP (ref 55–125)
CO2 SERPL-SCNC: 19 MMOL/L — LOW (ref 22–31)
CREAT ?TM UR-MCNC: 62 MG/DL — SIGNIFICANT CHANGE UP
CREAT SERPL-MCNC: 1.62 MG/DL — HIGH (ref 0.5–1.3)
CV B1 AB TITR FLD: NEGATIVE — SIGNIFICANT CHANGE UP
CV B2 AB TITR FLD: HIGH
CV B3 AB TITR FLD: NEGATIVE — SIGNIFICANT CHANGE UP
CV B4 AB TITR FLD: NEGATIVE — SIGNIFICANT CHANGE UP
CV B5 AB TITR FLD: HIGH
CV B6 AB TITR FLD: HIGH
GLUCOSE SERPL-MCNC: 249 MG/DL — HIGH (ref 70–99)
HCT VFR BLD CALC: 36.8 % — SIGNIFICANT CHANGE UP (ref 34.5–45)
HGB BLD-MCNC: 11.8 G/DL — SIGNIFICANT CHANGE UP (ref 11.5–15.5)
M PNEUMO IGG SER IA-ACNC: 0.72 INDEX — SIGNIFICANT CHANGE UP
M PNEUMO IGG SER IA-ACNC: NEGATIVE — SIGNIFICANT CHANGE UP
M PNEUMO IGM SER-ACNC: 99 UNITS/ML — SIGNIFICANT CHANGE UP
MCHC RBC-ENTMCNC: 30.5 PG — SIGNIFICANT CHANGE UP (ref 27–34)
MCHC RBC-ENTMCNC: 32.1 GM/DL — SIGNIFICANT CHANGE UP (ref 32–36)
MCV RBC AUTO: 95.1 FL — SIGNIFICANT CHANGE UP (ref 80–100)
MYCOPLASMA AG SPEC QL: NEGATIVE — SIGNIFICANT CHANGE UP
NRBC # BLD: 0 /100 WBCS — SIGNIFICANT CHANGE UP (ref 0–0)
OSMOLALITY UR: 486 MOS/KG — SIGNIFICANT CHANGE UP (ref 50–1200)
PLATELET # BLD AUTO: 345 K/UL — SIGNIFICANT CHANGE UP (ref 150–400)
POTASSIUM SERPL-MCNC: 4.2 MMOL/L — SIGNIFICANT CHANGE UP (ref 3.5–5.3)
POTASSIUM SERPL-SCNC: 4.2 MMOL/L — SIGNIFICANT CHANGE UP (ref 3.5–5.3)
POTASSIUM UR-SCNC: 15 MMOL/L — LOW (ref 25–125)
RBC # BLD: 3.87 M/UL — SIGNIFICANT CHANGE UP (ref 3.8–5.2)
RBC # FLD: 15.3 % — HIGH (ref 10.3–14.5)
SODIUM SERPL-SCNC: 140 MMOL/L — SIGNIFICANT CHANGE UP (ref 135–145)
SODIUM UR-SCNC: 46 MMOL/L — SIGNIFICANT CHANGE UP (ref 40–220)
WBC # BLD: 24.48 K/UL — HIGH (ref 3.8–10.5)
WBC # FLD AUTO: 24.48 K/UL — HIGH (ref 3.8–10.5)

## 2019-04-02 PROCEDURE — 86708 HEPATITIS A ANTIBODY: CPT

## 2019-04-02 PROCEDURE — 82570 ASSAY OF URINE CREATININE: CPT

## 2019-04-02 PROCEDURE — 80053 COMPREHEN METABOLIC PANEL: CPT

## 2019-04-02 PROCEDURE — 82785 ASSAY OF IGE: CPT

## 2019-04-02 PROCEDURE — 99239 HOSP IP/OBS DSCHRG MGMT >30: CPT | Mod: GC

## 2019-04-02 PROCEDURE — 83605 ASSAY OF LACTIC ACID: CPT

## 2019-04-02 PROCEDURE — 83986 ASSAY PH BODY FLUID NOS: CPT

## 2019-04-02 PROCEDURE — 84133 ASSAY OF URINE POTASSIUM: CPT

## 2019-04-02 PROCEDURE — 82436 ASSAY OF URINE CHLORIDE: CPT

## 2019-04-02 PROCEDURE — 71045 X-RAY EXAM CHEST 1 VIEW: CPT

## 2019-04-02 PROCEDURE — 83735 ASSAY OF MAGNESIUM: CPT

## 2019-04-02 PROCEDURE — 85027 COMPLETE CBC AUTOMATED: CPT

## 2019-04-02 PROCEDURE — 74018 RADEX ABDOMEN 1 VIEW: CPT

## 2019-04-02 PROCEDURE — 83935 ASSAY OF URINE OSMOLALITY: CPT

## 2019-04-02 PROCEDURE — 86658 ENTEROVIRUS ANTIBODY: CPT

## 2019-04-02 PROCEDURE — 81001 URINALYSIS AUTO W/SCOPE: CPT

## 2019-04-02 PROCEDURE — 86780 TREPONEMA PALLIDUM: CPT

## 2019-04-02 PROCEDURE — 87040 BLOOD CULTURE FOR BACTERIA: CPT

## 2019-04-02 PROCEDURE — 86790 VIRUS ANTIBODY NOS: CPT

## 2019-04-02 PROCEDURE — 84100 ASSAY OF PHOSPHORUS: CPT

## 2019-04-02 PROCEDURE — 80048 BASIC METABOLIC PNL TOTAL CA: CPT

## 2019-04-02 PROCEDURE — 86803 HEPATITIS C AB TEST: CPT

## 2019-04-02 PROCEDURE — 87086 URINE CULTURE/COLONY COUNT: CPT

## 2019-04-02 PROCEDURE — 84300 ASSAY OF URINE SODIUM: CPT

## 2019-04-02 PROCEDURE — 96375 TX/PRO/DX INJ NEW DRUG ADDON: CPT

## 2019-04-02 PROCEDURE — 99285 EMERGENCY DEPT VISIT HI MDM: CPT | Mod: 25

## 2019-04-02 PROCEDURE — 86738 MYCOPLASMA ANTIBODY: CPT

## 2019-04-02 PROCEDURE — 96374 THER/PROPH/DIAG INJ IV PUSH: CPT

## 2019-04-02 PROCEDURE — 36415 COLL VENOUS BLD VENIPUNCTURE: CPT

## 2019-04-02 PROCEDURE — 86709 HEPATITIS A IGM ANTIBODY: CPT

## 2019-04-02 PROCEDURE — 86747 PARVOVIRUS ANTIBODY: CPT

## 2019-04-02 PROCEDURE — 93005 ELECTROCARDIOGRAM TRACING: CPT

## 2019-04-02 RX ORDER — DIPHENHYDRAMINE HCL/ZINC ACET 2 %-0.1 %
1 CREAM (GRAM) TOPICAL
Qty: 0 | Refills: 0 | COMMUNITY
Start: 2019-04-02

## 2019-04-02 RX ORDER — AZITHROMYCIN 500 MG/1
500 TABLET, FILM COATED ORAL ONCE
Qty: 0 | Refills: 0 | Status: COMPLETED | OUTPATIENT
Start: 2019-04-02 | End: 2019-04-02

## 2019-04-02 RX ORDER — FAMOTIDINE 10 MG/ML
0 INJECTION INTRAVENOUS
Qty: 0 | Refills: 0 | COMMUNITY

## 2019-04-02 RX ORDER — CALAMINE AND ZINC OXIDE AND PHENOL 160; 10 MG/ML; MG/ML
1 LOTION TOPICAL
Qty: 0 | Refills: 0 | COMMUNITY
Start: 2019-04-02

## 2019-04-02 RX ORDER — OLMESARTAN MEDOXOMIL 5 MG/1
0 TABLET, FILM COATED ORAL
Qty: 0 | Refills: 0 | COMMUNITY

## 2019-04-02 RX ORDER — PANTOPRAZOLE SODIUM 20 MG/1
1 TABLET, DELAYED RELEASE ORAL
Qty: 30 | Refills: 0 | OUTPATIENT
Start: 2019-04-02 | End: 2019-05-01

## 2019-04-02 RX ORDER — AZITHROMYCIN 500 MG/1
500 TABLET, FILM COATED ORAL
Qty: 6 | Refills: 0 | OUTPATIENT
Start: 2019-04-02 | End: 2019-04-06

## 2019-04-02 RX ORDER — LOSARTAN POTASSIUM 100 MG/1
0 TABLET, FILM COATED ORAL
Qty: 0 | Refills: 0 | COMMUNITY

## 2019-04-02 RX ADMIN — Medication 1 TABLET(S): at 12:04

## 2019-04-02 RX ADMIN — PANTOPRAZOLE SODIUM 40 MILLIGRAM(S): 20 TABLET, DELAYED RELEASE ORAL at 06:24

## 2019-04-02 RX ADMIN — CALAMINE AND ZINC OXIDE AND PHENOL 1 APPLICATION(S): 160; 10 LOTION TOPICAL at 12:03

## 2019-04-02 RX ADMIN — Medication 50 MILLIGRAM(S): at 06:24

## 2019-04-02 RX ADMIN — AZITHROMYCIN 500 MILLIGRAM(S): 500 TABLET, FILM COATED ORAL at 11:59

## 2019-04-02 RX ADMIN — CALAMINE AND ZINC OXIDE AND PHENOL 1 APPLICATION(S): 160; 10 LOTION TOPICAL at 06:23

## 2019-04-02 RX ADMIN — HEPARIN SODIUM 5000 UNIT(S): 5000 INJECTION INTRAVENOUS; SUBCUTANEOUS at 06:25

## 2019-04-02 RX ADMIN — Medication 40 MILLIGRAM(S): at 06:24

## 2019-04-02 RX ADMIN — Medication 325 MILLIGRAM(S): at 06:24

## 2019-04-02 NOTE — PROGRESS NOTE ADULT - PROBLEM SELECTOR PLAN 4
Secondary to Diarrhea.  Started sodium bicarb x 2 days  Send Urine sodium potassium and chloride to calculate UAG.
GI prophylaxis with Pepcid  DVT prophylaxis with Heparin
UAG negative, most likely her acidosis was and is secondary to GI losses  f.u outpatient

## 2019-04-02 NOTE — PROGRESS NOTE ADULT - PROBLEM SELECTOR PROBLEM 4
Need for prophylactic measure
Metabolic acidosis, normal anion gap (NAG)
Metabolic acidosis, normal anion gap (NAG)

## 2019-04-02 NOTE — DISCHARGE NOTE NURSING/CASE MANAGEMENT/SOCIAL WORK - NSDCDPATPORTLINK_GEN_ALL_CORE
You can access the [facility] Patient Portal, offered by [facility], by registering with the following website: [email or web address]

## 2019-04-02 NOTE — DISCHARGE NOTE PROVIDER - HOSPITAL COURSE
Pt is a 74F, from home, w/ PMHx of Plaque Psoriasis, and Htn who presents with diffuse rash x 1 days.  Pt states She was recently started on new medication by her GI specialist, Dr. Otoole, for persistent diarrhea- dicyclomine. She took the medication Thursday night and awoke with a diffuse rash Friday morning encompassing the entire body including face, chest, back, arms, legs, and palms, sparing the soles.  She describes it as red and very itchy.  Pt states she recently returned from a trip to [facility] on 3/8 at which point she developed diffuse water diarrhea and abd pain.  On 3/22, Pt went to the ER and syncopized due to severe abd pain.  She underwent cardiac workup which was neg, but diarrhea persisted.  Stool cultures for infectious cause were negative including C.diff.   She was discharged and went to [facility], Dr. Otoole, who prescribed her prednisone, dicyclomine, and BRAT diet, and the diarrhea improved. She states she clinically improved, and presented for the rash.  Pt denies sick contacts.  States that she did not go in the water or pools when in [facility].  She denies consuming unusual foods.  Pt denies GI problems in the past.   Pt denies fever, abd pain, blood tinged stool, N/V, HA, Urinary symptoms, dizziness.    Pt was admitted with WBC > 43. It is trending down. As of 4/1/19 WBC 22.5.    Pt was treated with Prednisone , Benadryl for rash and itch as well as Calamine lotion to arms and leg as needed.    This AM Pt reported frustration that she wishes to go home. She also reported yellow sputum. She refused her labs today as well. She agrees now to lab work.    On exam she had decreased BS to RT base with slight wheeze. Offered her a CXR and Zithromax but she is refusing and is requesting AMA. She had an appointment today with her PCP Lashae Casas but cancelled but she is in contact with her to reschedule for later today. Pt is a 74F, from home, w/ PMHx of Plaque Psoriasis, and Htn who presents with diffuse rash x 1 days.  Pt states She was recently started on new medication by her GI specialist, Dr. Otoole, for persistent diarrhea- dicyclomine. She took the medication Thursday night and awoke with a diffuse rash Friday morning encompassing the entire body including face, chest, back, arms, legs, and palms, sparing the soles.  She describes it as red and very itchy.  Pt states she recently returned from a trip to [facility] on 3/8 at which point she developed diffuse water diarrhea and abd pain.  On 3/22, Pt went to the ER and syncopized due to severe abd pain.  She underwent cardiac workup which was neg, but diarrhea persisted.  Stool cultures for infectious cause were negative including C.diff.   She was discharged and went to [facility], Dr. Otoole, who prescribed her prednisone, dicyclomine, and BRAT diet, and the diarrhea improved. She states she clinically improved, and presented for the rash.  Pt denies sick contacts.  States that she did not go in the water or pools when in [facility].  She denies consuming unusual foods.  Pt denies GI problems in the past.   Pt denies fever, abd pain, blood tinged stool, N/V, HA, Urinary symptoms, dizziness.    Pt was admitted with WBC > 43. It is trending down. As of 4/1/19 WBC 22.5.    Pt was treated with Prednisone , Benadryl for rash and itch as well as Calamine lotion to arms and leg as needed.    This AM Pt reported frustration that she wishes to go home. She also reported yellow sputum. She refused her labs today as well. She agrees now to lab work.    On exam she had decreased BS to RT base with slight wheeze. Offered her a CXR and Zithromax but she is refusing and is requesting AMA. She had an appointment today with her PCP Lashae Casas but cancelled but she is in contact with her to reschedule for later today. was seen by Dr Bello and Dr Buster Cotton and the agreed to discharge her.

## 2019-04-02 NOTE — PROGRESS NOTE ADULT - PROBLEM SELECTOR PLAN 5
Most likely secondary to cryptosporidium.  Resolved now.
Atarax po HS as pt reports itch is worst at HS and continue Calamine lotion
Most likely secondary to cryptosporidium.  Resolved now.

## 2019-04-02 NOTE — DISCHARGE NOTE PROVIDER - NSDCCPCAREPLAN_GEN_ALL_CORE_FT
PRINCIPAL DISCHARGE DIAGNOSIS  Diagnosis: Rash and nonspecific skin eruption  Assessment and Plan of Treatment: You will follow up with Dr Lashae Casas today and continue prednisone for rash      SECONDARY DISCHARGE DIAGNOSES  Diagnosis: Leukocytosis, unspecified type  Assessment and Plan of Treatment: wbc 22 K trending down    Diagnosis: Itch  Assessment and Plan of Treatment: continue benadryl po and Benadryl cream topically  prn itch PRINCIPAL DISCHARGE DIAGNOSIS  Diagnosis: Rash and nonspecific skin eruption  Assessment and Plan of Treatment: You will follow up with Dr Lashae Casas today and continue prednisone for rash. You will take 20 mg x 2 days then 10 mg x 2 days then stop.      SECONDARY DISCHARGE DIAGNOSES  Diagnosis: Leukocytosis, unspecified type  Assessment and Plan of Treatment: wbc 24 K trending down from admit    Diagnosis: Itch  Assessment and Plan of Treatment: continue benadryl po and Benadryl cream topically  prn itch

## 2019-04-02 NOTE — PROGRESS NOTE ADULT - PROBLEM SELECTOR PLAN 1
Rash persists but as per pt it is improving. Continue Calamine lotion and atarax prn itching @ HS.
Secondary to colitis. Most likely cryptosporidium as per ID  Diarrhea resolved. Had normal BM today  Plan to DC.
Secondary to colitis. Most likely cryptosporidium as per ID  Diarrhea resolved now. Continue IV fluid.  Rocephin discontinued as per ID.

## 2019-04-02 NOTE — PROGRESS NOTE ADULT - PROBLEM SELECTOR PLAN 3
Improving  now 1.77 , down from 3.55. Continue NS @ 75 cc/hr.
Continue Prednisone taper.
Drug allergy rash; wheel and flare  Switch to Prednisone 40 mg with the benadryl. Slowly improving.

## 2019-04-02 NOTE — PROGRESS NOTE ADULT - ASSESSMENT
allergic rxn  Leukocytosis    plan - can dc home today on prednisone 20mgs po qd x 2 days then 10mgs po qd x 2 days allergic rxn  Leukocytosis    plan - can dc home today on prednisone 20mgs po qd x 2 days then 10mgs po qd x 2 days  reconsult prn

## 2019-04-02 NOTE — PROGRESS NOTE ADULT - PROBLEM SELECTOR PLAN 7
[] Previous VTE                                                3  [] Thrombophilia                                             2  [] Lower limb paralysis                                   2    [] Current Cancer                                             2   [x] Immobilization > 24 hrs                              1  [] ICU/CCU stay > 24 hours                             1  [x] Age > 60                                                         1    IMPROVE VTE Score: Heparin subq for DVT prophy
[] Previous VTE                                                3  [] Thrombophilia                                             2  [] Lower limb paralysis                                   2    [] Current Cancer                                             2   [x] Immobilization > 24 hrs                              1  [] ICU/CCU stay > 24 hours                             1  [x] Age > 60                                                         1    IMPROVE VTE Score: Heparin subq for DVT prophy

## 2019-04-02 NOTE — DISCHARGE NOTE PROVIDER - CARE PROVIDER_API CALL
Lashae Casas)  Family Medicine  [address] [place] [address]  Phone: [phone]  Fax: [phone]  Follow Up Time:

## 2019-04-02 NOTE — PROGRESS NOTE ADULT - PROBLEM SELECTOR PLAN 2
Leukocytosis most likely 2/2 22.5. It is trending downward from 30 K
resolving  She will follow with PCP today.
ATN secondary to sepsis and hypotension.  Continue IV fluids, Creatinine improving today.

## 2019-04-02 NOTE — PROGRESS NOTE ADULT - SUBJECTIVE AND OBJECTIVE BOX
Patient is a 74y old  Female who presents with a chief complaint of Diffuse Rash (02 Apr 2019 12:18)    INTERVAL HPI/OVERNIGHT EVENTS:  Patient seen and examined at bedside, feels ok  She wants to leave today. Blood work done, WBC 24 but she looks clinically much better.    Allergies    cholestyramine (Rash; Urticaria; Hives)  dicyclomine (Rash; Urticaria; Hives)  penicillins (Rash)    Intolerances      REVIEW OF SYSTEMS:  CONSTITUTIONAL: No fever, weight loss, or fatigue  EYES: No eye pain, visual disturbances, or discharge  RESPIRATORY: No cough, wheezing or shortness of breath  CARDIOVASCULAR: No chest pain, feeling of heart beats  GASTROINTESTINAL: No abdominal or epigastric pain. No nausea, vomiting; No diarrhea or constipation.  GENITOURINARY: No dysuria, frequency, hematuria  NEUROLOGICAL: No headaches  MUSCULOSKELETAL: No joint pain  PSYCHIATRIC: No depression or anxiety  HEME/LYMPH: No easy bruising, or bleeding gums  ALLERGY AND IMMUNOLOGIC: rash is better.     Medications:  calamine Lotion 1 Application(s) Topical four times a day  calcium carbonate   1250 mG (OsCal) 1 Tablet(s) Oral daily  diphenhydrAMINE   Injectable 50 milliGRAM(s) IntraMuscular every 6 hours PRN Rash and/or Itching  diphenhydrAMINE 2%/zinc acetate 0.1% Cream 1 Application(s) Topical every 12 hours PRN Itching  heparin  Injectable 5000 Unit(s) SubCutaneous every 8 hours  pantoprazole    Tablet 40 milliGRAM(s) Oral before breakfast  predniSONE   Tablet 40 milliGRAM(s) Oral daily  sodium chloride 0.9%. 1000 milliLiter(s) IV Continuous <Continuous>      PHYSICAL EXAM:  Vital Signs Last 24 Hrs  T(C): 36.6 (02 Apr 2019 05:32), Max: 36.9 (01 Apr 2019 20:31)  T(F): 97.8 (02 Apr 2019 05:32), Max: 98.5 (01 Apr 2019 20:31)  HR: 61 (02 Apr 2019 05:32) (61 - 61)  BP: 124/46 (02 Apr 2019 05:32) (124/46 - 147/73)  BP(mean): --  RR: 16 (02 Apr 2019 05:32) (16 - 18)  SpO2: 99% (02 Apr 2019 05:32) (97% - 99%)    GENERAL: NAD  HEAD:  Atraumatic, Normocephalic  EYES: EOMI, PERRLA, conjunctiva and sclera clear  ENT: moist mucous membranes  NECK: Supple, No JVD, Normal thyroid  NERVOUS SYSTEM:  Alert & Oriented X3, Good concentration;  CHEST/LUNG: No rales, rhonchi, wheezing, or rubs  HEART: Regular rate and rhythm; No murmurs, rubs, or gallops  ABDOMEN: Soft, Nontender, Nondistended; Bowel sounds present  EXTREMITIES:  2+ Peripheral Pulses, No clubbing, cyanosis, or edema  SKIN: No rashes or lesions    LABS:                        11.8   24.48 )-----------( 345      ( 02 Apr 2019 11:19 )             36.8     04-02    140  |  112<H>  |  28<H>  ----------------------------<  249<H>  4.2   |  19<L>  |  1.62<H>    Ca    7.5<L>      02 Apr 2019 11:19  Phos  2.7     04-01  Mg     1.7     04-01                  Culture & Sensitivity:   CAPILLARY BLOOD GLUCOSE            RADIOLOGY & ADDITIONAL TESTS:  Radiology testing independently reviewed:     Consultant(s) Notes Reviewed:  [ x] YES  [ ] NO    Care Discussed with Consultants/Other Providers [ x] YES  [ ] NO

## 2019-04-03 LAB — HEV AB FLD QL: NEGATIVE — SIGNIFICANT CHANGE UP

## 2019-04-04 LAB
CULTURE RESULTS: SIGNIFICANT CHANGE UP
CULTURE RESULTS: SIGNIFICANT CHANGE UP
HEV IGM SER QL: SIGNIFICANT CHANGE UP
SPECIMEN SOURCE: SIGNIFICANT CHANGE UP
SPECIMEN SOURCE: SIGNIFICANT CHANGE UP

## 2019-07-09 ENCOUNTER — APPOINTMENT (OUTPATIENT)
Dept: OTOLARYNGOLOGY | Facility: CLINIC | Age: 74
End: 2019-07-09
Payer: MEDICARE

## 2019-07-09 VITALS
BODY MASS INDEX: 26.31 KG/M2 | HEIGHT: 62 IN | SYSTOLIC BLOOD PRESSURE: 176 MMHG | DIASTOLIC BLOOD PRESSURE: 106 MMHG | WEIGHT: 143 LBS | HEART RATE: 75 BPM

## 2019-07-09 DIAGNOSIS — Z87.891 PERSONAL HISTORY OF NICOTINE DEPENDENCE: ICD-10-CM

## 2019-07-09 DIAGNOSIS — K31.9 DISEASE OF STOMACH AND DUODENUM, UNSPECIFIED: ICD-10-CM

## 2019-07-09 DIAGNOSIS — H91.93 UNSPECIFIED HEARING LOSS, BILATERAL: ICD-10-CM

## 2019-07-09 DIAGNOSIS — Z80.0 FAMILY HISTORY OF MALIGNANT NEOPLASM OF DIGESTIVE ORGANS: ICD-10-CM

## 2019-07-09 DIAGNOSIS — Z86.79 PERSONAL HISTORY OF OTHER DISEASES OF THE CIRCULATORY SYSTEM: ICD-10-CM

## 2019-07-09 PROCEDURE — 99203 OFFICE O/P NEW LOW 30 MIN: CPT | Mod: 25

## 2019-07-09 PROCEDURE — 92557 COMPREHENSIVE HEARING TEST: CPT

## 2019-07-09 PROCEDURE — 92567 TYMPANOMETRY: CPT

## 2019-07-09 RX ORDER — HYDROXYZINE HYDROCHLORIDE 25 MG/1
25 TABLET ORAL
Qty: 120 | Refills: 0 | Status: DISCONTINUED | COMMUNITY
Start: 2019-04-05

## 2019-07-09 RX ORDER — FAMOTIDINE 40 MG/1
40 TABLET, FILM COATED ORAL
Qty: 60 | Refills: 0 | Status: DISCONTINUED | COMMUNITY
Start: 2019-03-28

## 2019-07-09 RX ORDER — OXYCODONE AND ACETAMINOPHEN 5; 325 MG/1; MG/1
5-325 TABLET ORAL
Qty: 9 | Refills: 0 | Status: DISCONTINUED | COMMUNITY
Start: 2019-03-22

## 2019-07-09 RX ORDER — DICYCLOMINE HYDROCHLORIDE 10 MG/1
10 CAPSULE ORAL 3 TIMES DAILY
Qty: 90 | Refills: 3 | Status: DISCONTINUED | COMMUNITY
Start: 2019-03-25 | End: 2019-07-09

## 2019-07-09 RX ORDER — OLMESARTAN MEDOXOMIL / AMLODIPINE BESYLATE / HYDROCHLOROTHIAZIDE 40; 10; 12.5 MG/1; MG/1; MG/1
40-10-12.5 TABLET, FILM COATED ORAL
Refills: 0 | Status: DISCONTINUED | COMMUNITY
End: 2019-07-09

## 2019-07-09 RX ORDER — PREDNISONE 10 MG/1
10 TABLET ORAL
Qty: 20 | Refills: 0 | Status: DISCONTINUED | COMMUNITY
Start: 2019-04-02

## 2019-07-09 RX ORDER — CIPROFLOXACIN HYDROCHLORIDE 500 MG/1
500 TABLET, FILM COATED ORAL
Qty: 14 | Refills: 0 | Status: DISCONTINUED | COMMUNITY
Start: 2019-03-19

## 2019-07-09 RX ORDER — OLMESARTAN MEDOXOMIL 40 MG/1
40 TABLET, FILM COATED ORAL
Qty: 90 | Refills: 0 | Status: DISCONTINUED | COMMUNITY
Start: 2019-02-04

## 2019-07-09 RX ORDER — LOPERAMIDE HYDROCHLORIDE 2 MG/1
2 CAPSULE ORAL
Qty: 5 | Refills: 0 | Status: DISCONTINUED | COMMUNITY
Start: 2019-03-22

## 2019-07-09 RX ORDER — HYDROCORTISONE 2.5% 25 MG/G
2.5 CREAM TOPICAL 3 TIMES DAILY
Qty: 30 | Refills: 0 | Status: DISCONTINUED | COMMUNITY
Start: 2019-03-25 | End: 2019-07-09

## 2019-07-09 RX ORDER — FELODIPINE 5 MG/1
5 TABLET, EXTENDED RELEASE ORAL
Qty: 85 | Refills: 0 | Status: DISCONTINUED | COMMUNITY
Start: 2019-02-04

## 2019-07-09 RX ORDER — METOPROLOL SUCCINATE 100 MG/1
100 TABLET, EXTENDED RELEASE ORAL
Qty: 90 | Refills: 0 | Status: DISCONTINUED | COMMUNITY
Start: 2019-02-04

## 2019-07-09 RX ORDER — ONDANSETRON 4 MG/1
4 TABLET ORAL
Qty: 15 | Refills: 0 | Status: DISCONTINUED | COMMUNITY
Start: 2019-03-22

## 2019-07-09 RX ORDER — AZITHROMYCIN 250 MG/1
250 TABLET, FILM COATED ORAL
Qty: 6 | Refills: 0 | Status: DISCONTINUED | COMMUNITY
Start: 2019-04-02

## 2019-07-09 RX ORDER — PREDNISONE 5 MG/1
5 TABLET ORAL
Qty: 30 | Refills: 0 | Status: DISCONTINUED | COMMUNITY
Start: 2019-03-28

## 2019-07-09 RX ORDER — TRIAMCINOLONE ACETONIDE 1 MG/G
0.1 CREAM TOPICAL
Qty: 454 | Refills: 0 | Status: DISCONTINUED | COMMUNITY
Start: 2019-04-05

## 2019-07-09 RX ORDER — PANTOPRAZOLE 40 MG/1
40 TABLET, DELAYED RELEASE ORAL
Qty: 90 | Refills: 0 | Status: DISCONTINUED | COMMUNITY
Start: 2019-04-02

## 2019-07-09 RX ORDER — CHOLESTYRAMINE 4 G/9G
4 POWDER, FOR SUSPENSION ORAL 3 TIMES DAILY
Qty: 30 | Refills: 3 | Status: DISCONTINUED | COMMUNITY
Start: 2019-03-25 | End: 2019-07-09

## 2019-07-09 RX ORDER — FELODIPINE 2.5 MG/1
2.5 TABLET, EXTENDED RELEASE ORAL
Refills: 0 | Status: DISCONTINUED | COMMUNITY
End: 2019-07-09

## 2019-07-09 RX ORDER — METOPROLOL SUCCINATE 50 MG/1
50 TABLET, EXTENDED RELEASE ORAL
Refills: 0 | Status: DISCONTINUED | COMMUNITY
End: 2019-07-09

## 2019-07-09 RX ORDER — POLYETHYLENE GLYCOL 3350, SODIUM CHLORIDE, SODIUM BICARBONATE AND POTASSIUM CHLORIDE WITH LEMON FLAVOR 420; 11.2; 5.72; 1.48 G/4L; G/4L; G/4L; G/4L
420 POWDER, FOR SOLUTION ORAL
Qty: 1 | Refills: 0 | Status: DISCONTINUED | COMMUNITY
Start: 2019-03-27 | End: 2019-07-09

## 2019-07-12 PROBLEM — H91.93 HEARING LOSS OF BOTH EARS: Status: ACTIVE | Noted: 2019-07-09

## 2019-07-12 NOTE — HISTORY OF PRESENT ILLNESS
[de-identified] : 74 year old female initial visit for bilateral hearing loss, Left better than Right, reports use of hearing aids for about 3 years, recently lost them January 2019, states hearing has progressively gotten worse since then.  Patient states unable to grasp what is fully said when someone speaking.  Denies otalgia, otorrhea, tinnitus, dizziness, vertigo, headaches related to ears and ear infections in the past year.  CT Head March 2019.  History of GI problems, states when symptoms are exacerbated, hearing becomes more impaired.

## 2019-07-12 NOTE — REASON FOR VISIT
[Initial Evaluation] : an initial evaluation for [Spouse] : spouse [FreeTextEntry2] : Initial visit for bilateral hearing loss, Left better than Right, reports use of hearing aids for about 3 years, recently lost them January 2019, states hearing has progressively gotten worse since then.

## 2019-07-24 ENCOUNTER — APPOINTMENT (OUTPATIENT)
Dept: PHARMACY | Facility: CLINIC | Age: 74
End: 2019-07-24
Payer: SELF-PAY

## 2019-07-24 PROCEDURE — V5010 ASSESSMENT FOR HEARING AID: CPT

## 2019-08-19 ENCOUNTER — APPOINTMENT (OUTPATIENT)
Dept: NUCLEAR MEDICINE | Facility: IMAGING CENTER | Age: 74
End: 2019-08-19
Payer: MEDICARE

## 2019-08-19 ENCOUNTER — OUTPATIENT (OUTPATIENT)
Dept: OUTPATIENT SERVICES | Facility: HOSPITAL | Age: 74
LOS: 1 days | End: 2019-08-19
Payer: COMMERCIAL

## 2019-08-19 DIAGNOSIS — R19.7 DIARRHEA, UNSPECIFIED: ICD-10-CM

## 2019-08-19 PROCEDURE — 78815 PET IMAGE W/CT SKULL-THIGH: CPT | Mod: 26,PI

## 2019-08-19 PROCEDURE — 78815 PET IMAGE W/CT SKULL-THIGH: CPT

## 2019-08-19 PROCEDURE — A9587: CPT

## 2019-08-20 ENCOUNTER — APPOINTMENT (OUTPATIENT)
Dept: PHARMACY | Facility: CLINIC | Age: 74
End: 2019-08-20
Payer: SELF-PAY

## 2019-08-20 PROCEDURE — V5261G: CUSTOM

## 2019-09-09 ENCOUNTER — APPOINTMENT (OUTPATIENT)
Dept: PHARMACY | Facility: CLINIC | Age: 74
End: 2019-09-09
Payer: SELF-PAY

## 2019-09-09 PROCEDURE — V5299A: CUSTOM | Mod: NC

## 2019-11-13 ENCOUNTER — RX RENEWAL (OUTPATIENT)
Age: 74
End: 2019-11-13

## 2019-12-09 ENCOUNTER — APPOINTMENT (OUTPATIENT)
Dept: PHARMACY | Facility: CLINIC | Age: 74
End: 2019-12-09
Payer: SELF-PAY

## 2019-12-09 PROCEDURE — V5299A: CUSTOM | Mod: NC

## 2020-10-21 ENCOUNTER — APPOINTMENT (OUTPATIENT)
Dept: PHARMACY | Facility: CLINIC | Age: 75
End: 2020-10-21
Payer: SELF-PAY

## 2020-10-21 PROCEDURE — V5267D: CUSTOM

## 2020-10-21 PROCEDURE — V5265A: CUSTOM

## 2020-12-03 ENCOUNTER — NON-APPOINTMENT (OUTPATIENT)
Age: 75
End: 2020-12-03

## 2020-12-15 ENCOUNTER — NON-APPOINTMENT (OUTPATIENT)
Age: 75
End: 2020-12-15

## 2020-12-15 ENCOUNTER — APPOINTMENT (OUTPATIENT)
Dept: DERMATOLOGY | Facility: CLINIC | Age: 75
End: 2020-12-15
Payer: MEDICARE

## 2020-12-15 VITALS — WEIGHT: 158 LBS | HEIGHT: 62 IN | BODY MASS INDEX: 29.08 KG/M2

## 2020-12-15 PROCEDURE — 99072 ADDL SUPL MATRL&STAF TM PHE: CPT

## 2020-12-15 PROCEDURE — 99203 OFFICE O/P NEW LOW 30 MIN: CPT

## 2021-03-22 ENCOUNTER — APPOINTMENT (OUTPATIENT)
Dept: DERMATOLOGY | Facility: CLINIC | Age: 76
End: 2021-03-22

## 2021-04-12 ENCOUNTER — APPOINTMENT (OUTPATIENT)
Dept: DERMATOLOGY | Facility: CLINIC | Age: 76
End: 2021-04-12

## 2021-04-22 NOTE — ED ADULT NURSE REASSESSMENT NOTE - NS ED NURSE REASSESS COMMENT FT1
1. Have you been to the ER, urgent care clinic since your last visit? Hospitalized since your last visit? No    2. Have you seen or consulted any other health care providers outside of the [address] [place] [address] since your last visit? Include any pap smears or colon screening. No       Patient has completed both COVID 19 vaccines.  Card copied    Health Maintenance Due   Topic Date Due    Shingrix Vaccine Age 49> (1 of 2) Never done    Pneumococcal 65+ years (1 of 1 - PPSV23) Never done    Lipid Screen  10/07/2020 alert and oriented x3, nad, with skin rash x 4 days

## 2021-06-15 ENCOUNTER — APPOINTMENT (OUTPATIENT)
Dept: DERMATOLOGY | Facility: CLINIC | Age: 76
End: 2021-06-15
Payer: MEDICARE

## 2021-06-15 VITALS — HEIGHT: 62 IN | BODY MASS INDEX: 29.63 KG/M2 | WEIGHT: 161 LBS

## 2021-06-15 PROCEDURE — 99213 OFFICE O/P EST LOW 20 MIN: CPT | Mod: GC

## 2021-07-09 ENCOUNTER — NON-APPOINTMENT (OUTPATIENT)
Age: 76
End: 2021-07-09

## 2021-07-09 DIAGNOSIS — R93.89 ABNORMAL FINDINGS ON DIAGNOSTIC IMAGING OF OTHER SPECIFIED BODY STRUCTURES: ICD-10-CM

## 2021-07-09 DIAGNOSIS — K86.89 OTHER SPECIFIED DISEASES OF PANCREAS: ICD-10-CM

## 2021-07-14 NOTE — PATIENT PROFILE ADULT - NSPROMEDSADMININFO_GEN_A_NUR
Vital Signs per nursing documentation  Gen: well appearing, no acute distress  HEENT: NCAT, MMM  Cardiac: regular rate rhythm, normal S1S2  Chest: clear to auscultation bilateral, no wheezes or crackles, no chest wall tenderness   Abdomen: soft, non tender non distended  Extremity: no gross deformity, good perfusion  Skin: no rash  Neuro: nonfocal neuro exam, gait steady
no concerns

## 2021-09-30 DIAGNOSIS — Z01.818 ENCOUNTER FOR OTHER PREPROCEDURAL EXAMINATION: ICD-10-CM

## 2021-10-04 ENCOUNTER — APPOINTMENT (OUTPATIENT)
Dept: DISASTER EMERGENCY | Facility: CLINIC | Age: 76
End: 2021-10-04

## 2021-10-05 LAB — SARS-COV-2 N GENE NPH QL NAA+PROBE: NOT DETECTED

## 2021-10-07 ENCOUNTER — OUTPATIENT (OUTPATIENT)
Dept: OUTPATIENT SERVICES | Facility: HOSPITAL | Age: 76
LOS: 1 days | Discharge: HOME | End: 2021-10-07
Payer: MEDICARE

## 2021-10-07 ENCOUNTER — RESULT REVIEW (OUTPATIENT)
Age: 76
End: 2021-10-07

## 2021-10-07 ENCOUNTER — APPOINTMENT (OUTPATIENT)
Dept: GASTROENTEROLOGY | Facility: HOSPITAL | Age: 76
End: 2021-10-07

## 2021-10-07 VITALS
TEMPERATURE: 98 F | SYSTOLIC BLOOD PRESSURE: 208 MMHG | HEIGHT: 62 IN | WEIGHT: 162.92 LBS | RESPIRATION RATE: 16 BRPM | OXYGEN SATURATION: 98 % | DIASTOLIC BLOOD PRESSURE: 101 MMHG | HEART RATE: 63 BPM

## 2021-10-07 VITALS
RESPIRATION RATE: 17 BRPM | DIASTOLIC BLOOD PRESSURE: 51 MMHG | HEART RATE: 64 BPM | OXYGEN SATURATION: 96 % | SYSTOLIC BLOOD PRESSURE: 107 MMHG

## 2021-10-07 DIAGNOSIS — K86.89 OTHER SPECIFIED DISEASES OF PANCREAS: ICD-10-CM

## 2021-10-07 PROCEDURE — 43259 EGD US EXAM DUODENUM/JEJUNUM: CPT | Mod: GC

## 2021-10-07 PROCEDURE — 43239 EGD BIOPSY SINGLE/MULTIPLE: CPT | Mod: GC

## 2021-10-07 PROCEDURE — 88305 TISSUE EXAM BY PATHOLOGIST: CPT | Mod: 26

## 2021-10-07 NOTE — ASU PREOP CHECKLIST - WARM FLUIDS/WARM BLANKETS
Verified Results  URINALYSIS SCREEN with MICROSCOPIC IF INDICATED AND URINE CULTURE REFLEX [id number] 01:40PM Valeen Shell     Test Name Result Flag Reference   URINE COLOR YELLOW  YELLOW   APPEARANCE, URINE CLEAR     URINE GLUCOSE NEGATIVE mg/dl  NEGATIVE   URINE BILIRUBIN NEGATIVE  NEGATIVE   KETONES NEGATIVE mg/dl  NEGATIVE   URINE SPECIFIC GRAVITY 1.018  1.005-1.030   RBC-URINE MODERATE A NEGATIVE   PH-URINE 6.0 Units  5.0-7.0   URINE PROTEIN NEGATIVE mg/dl  NEGATIVE   UROBILINOGEN-URINE 0.2 mg/dl  0.0-1.0   NITRITE NEGATIVE  NEGATIVE   WBC-URINE NEGATIVE  NEGATIVE   SPECIMEN TYPE      URINE, CLEAN CATCH/MIDSTREAM   SQUAMOUS EPITHELIAL CELLS NONE SEEN /HPF  0-5   ERYTHROCYTES 11 to 25 /HPF H 0-3   LEUKOCYTES 1 to 5 /HPF  0-5   BACTERIA NONE SEEN /HPF  NONE SEEN   HYALINE CASTS NONE SEEN /LPF  0-5 no

## 2021-12-17 ENCOUNTER — APPOINTMENT (OUTPATIENT)
Dept: PHARMACY | Facility: CLINIC | Age: 76
End: 2021-12-17
Payer: SELF-PAY

## 2021-12-17 PROCEDURE — V5267D: CUSTOM

## 2022-01-20 LAB — SARS-COV-2 N GENE NPH QL NAA+PROBE: NOT DETECTED

## 2022-03-21 ENCOUNTER — APPOINTMENT (OUTPATIENT)
Dept: PHARMACY | Facility: CLINIC | Age: 77
End: 2022-03-21
Payer: SELF-PAY

## 2022-03-21 PROCEDURE — V5299A: CUSTOM | Mod: NC

## 2022-04-04 ENCOUNTER — APPOINTMENT (OUTPATIENT)
Dept: PHARMACY | Facility: CLINIC | Age: 77
End: 2022-04-04

## 2022-05-10 ENCOUNTER — APPOINTMENT (OUTPATIENT)
Dept: OTOLARYNGOLOGY | Facility: CLINIC | Age: 77
End: 2022-05-10
Payer: MEDICARE

## 2022-05-10 VITALS
BODY MASS INDEX: 30.36 KG/M2 | DIASTOLIC BLOOD PRESSURE: 81 MMHG | WEIGHT: 165 LBS | SYSTOLIC BLOOD PRESSURE: 143 MMHG | HEART RATE: 66 BPM | HEIGHT: 62 IN

## 2022-05-10 PROCEDURE — 92567 TYMPANOMETRY: CPT

## 2022-05-10 PROCEDURE — 92557 COMPREHENSIVE HEARING TEST: CPT

## 2022-05-10 PROCEDURE — 92504 EAR MICROSCOPY EXAMINATION: CPT

## 2022-05-10 PROCEDURE — 99213 OFFICE O/P EST LOW 20 MIN: CPT

## 2022-05-10 RX ORDER — DIPHENOXYLATE HYDROCHLORIDE AND ATROPINE SULFATE 2.5; .025 MG/1; MG/1
2.5-0.025 TABLET ORAL
Qty: 30 | Refills: 0 | Status: DISCONTINUED | COMMUNITY
Start: 2019-06-24 | End: 2022-05-10

## 2022-05-10 RX ORDER — ISOSORBIDE MONONITRATE 30 MG
30 TABLET, EXTENDED RELEASE 24 HR ORAL
Refills: 0 | Status: ACTIVE | COMMUNITY

## 2022-05-10 RX ORDER — SPIRONOLACTONE 50 MG/1
TABLET ORAL
Refills: 0 | Status: ACTIVE | COMMUNITY

## 2022-05-10 RX ORDER — METOPROLOL TARTRATE 75 MG/1
TABLET, FILM COATED ORAL
Refills: 0 | Status: ACTIVE | COMMUNITY

## 2022-05-10 RX ORDER — CHOLESTYRAMINE 4 G/9G
4 POWDER, FOR SUSPENSION ORAL 3 TIMES DAILY
Qty: 30 | Refills: 3 | Status: DISCONTINUED | COMMUNITY
Start: 2019-11-13 | End: 2022-05-10

## 2022-05-11 ENCOUNTER — NON-APPOINTMENT (OUTPATIENT)
Age: 77
End: 2022-05-11

## 2022-05-11 ENCOUNTER — APPOINTMENT (OUTPATIENT)
Dept: PHARMACY | Facility: CLINIC | Age: 77
End: 2022-05-11
Payer: SELF-PAY

## 2022-05-11 PROCEDURE — V5299A: CUSTOM | Mod: NC

## 2022-05-12 NOTE — PROCEDURE
[Sudden Hearing Loss] : Sudden Hearing Loss [Same] : same as the Pre Op Dx. [] : Binocular Microscopy [FreeTextEntry1] : hearing loss [FreeTextEntry4] : none [FreeTextEntry6] : Operative microscope was used to examine the ear canal, ear drum and visible middle ear landmarks. Adequate exam would not have been possible without the use of a microscope. Findings are described.\par \par

## 2022-05-12 NOTE — HISTORY OF PRESENT ILLNESS
[de-identified] : 77 year old female here to initial consultation ear checkup and hearing loss \par Reports history of bilateral hearing aids\par States hearing aids are not functioning correctly \par Reports difficulty hearing even when using HAs \par Reports hearing is stable but states she misses certain words during conversations \par Patient denies otalgia, otorrhea, ear infections, tinnitus, dizziness, vertigo, headaches related to hearing.

## 2022-05-12 NOTE — DATA REVIEWED
[de-identified] : An audiogram was ordered and performed including tympanometry, pure tones and speech, for patient's complaint of hearing loss\par I have independently reviewed the patient's audiogram from today and my findings include dimitry SNHL, stable from years prior\par \par

## 2022-06-14 ENCOUNTER — APPOINTMENT (OUTPATIENT)
Dept: PHARMACY | Facility: CLINIC | Age: 77
End: 2022-06-14
Payer: SELF-PAY

## 2022-06-14 PROCEDURE — V5299A: CUSTOM | Mod: NC

## 2022-06-21 ENCOUNTER — APPOINTMENT (OUTPATIENT)
Dept: DERMATOLOGY | Facility: CLINIC | Age: 77
End: 2022-06-21
Payer: MEDICARE

## 2022-06-21 DIAGNOSIS — L82.0 INFLAMED SEBORRHEIC KERATOSIS: ICD-10-CM

## 2022-06-21 PROCEDURE — 17000 DESTRUCT PREMALG LESION: CPT | Mod: GC,59

## 2022-06-21 PROCEDURE — 17110 DESTRUCTION B9 LES UP TO 14: CPT | Mod: GC

## 2022-06-21 PROCEDURE — 99214 OFFICE O/P EST MOD 30 MIN: CPT | Mod: GC,25

## 2022-08-23 NOTE — ED ADULT NURSE NOTE - NS PRO PASSIVE SMOKE EXP
He continue to deny episodes of rectal bleeding. Currently reports 2 bowel movements a day without strain. His stools are formed without blood or mucus present. He denies any rectal pain or pruritus ani.   Last visit (7/26/2022) Clinical Notes: Colonoscopy in 5 years.  He continue to deny episodes of rectal bleeding No

## 2022-09-03 NOTE — ED PROVIDER NOTE - CROS ED GU ALL NEG
Assessment and plan-  Sepsis-possibly secondary to urinary tract infection, stage IV sacral ulcers, patient started on antibiotics cefipime   ID consulted, leukocytosis on the labs,   Patient had fever yesterday, patient had CT of abdomen and pelvis done which showed fluid collection in the hip, IR/orthopedics consulted for possible drain, s/p drainage, 50 cc of pus removed, cultures pending, patient seen by orthopedics, no surgical intervention, continuing drainage/antibiotics    History of hypertension-monitor blood pressures  Spinal stenosis  Tachycardia- possibly sinus, significant, heart rate ranging between 130-150, cardiology consulted, Lopressor dose adjusted    Paraplegia  Diabetes-continue home medications, sliding scale  Sacral ulcers-stage IV, surgery consulted  Urostomy present  Hypokalemia-replacement per protocol  Kidney functions are normal  Protein calorie malnutrition  Anemia-monitor hemoglobin closely  We will continue to follow closely,    Chief complaint-fever with chills    History of presenting-  Patient is a 53-year-old female from nursing home with past medical history of spinal stenosis, paraplegia, urostomy, diabetes, hypertension, and sacral ulcer ulcers who started complaining of fever and chills so patient was sent to the emergency room for evaluation.  Patient was negative for COVID test, patient had dark-colored urine, and also has decubitus which possibly looks infected.  Patient was admitted for sepsis, ID and wound care were consulted.  Patient currently on room air, denies any new cough fever runny nose or sore throat, no abdominal pain no chest pain no back pain, no new focal weakness or numbness no new headache vision voice or hearing changes      Review of systems-- all systems negative other than what I enumerated in the hpi     Physical Exam  Vitals:    09/02/22 2351 09/03/22 0508 09/03/22 0844   Temp:  97.9 °F (36.6 °C) 97.5 °F (36.4 °C)   Pulse: (!) 113 (!) 102 88   Resp:    18   SpO2: 97% 94% 98%   BP: 99/63 93/53 91/60        General: Awake, Alert, NAD.  Head: Normocephalic, Atraumatic,  Neck: Supple, non-tender. No cervical LAD.  ENT: MMM.  Pulm: decreased air entry Bilaterally. No crackles or wheezing. No rales .  Cardiac: S1 and S2 present. No murmurs or extra heart sounds appreciated.  Abdomen: Soft, non-tender, non-distended abdomen. + BS  Musculoskeletal: Warm, well perfused, non-tender, non-edematous LEs.  Skin: No facial rash.  Neuro: Alert and oriented. No gross motor deficits.   Psych: Cooperative      Past Medical History  Past Medical History:   Diagnosis Date   • Anemia    • Anxiety    • Congestive cardiac failure (CMS/HCC)    • Diabetes mellitus (CMS/HCC)    • Essential (primary) hypertension    • Gastroesophageal reflux disease    • Hyperlipidemia    • Paraplegia (CMS/HCC)    • Sleep apnea    • Spinal stenosis          Surgical History  Past Surgical History:   Procedure Laterality Date   • Colostomy Left           Social History  Social History     Tobacco Use   • Smoking status: Never Smoker   • Smokeless tobacco: Never Used   Substance Use Topics   • Alcohol use: Not Currently   • Drug use: Not Currently         Family History  No family history on file.       Allergies  ALLERGIES:  Patient has no known allergies.        Inpatient Medications  Current Facility-Administered Medications   Medication   • VANCOMYCIN - PHARMACIST MONITORED Misc   • vancomycin 1,500 mg in sodium chloride 0.9% 250 mL IVPB   • dextrose 50 % injection 25 g   • dextrose 50 % injection 12.5 g   • glucagon (GLUCAGEN) injection 1 mg   • dextrose (GLUTOSE) 40 % gel 15 g   • dextrose (GLUTOSE) 40 % gel 30 g   • insulin lispro (ADMELOG,HumaLOG) - Correction Dose   • insulin lispro (ADMELOG,HumaLOG) - Correction Dose   • atorvastatin (LIPITOR) tablet 10 mg   • cholecalciferol (VITAMIN D) tablet 10,000 Units   • cyanocobalamin (Vitamin B-12) tablet 500 mcg   • cyclobenzaprine (FLEXERIL) tablet 10 mg    • enoxaparin (LOVENOX) injection 30 mg   • hydroCHLOROthiazide (HYDRODIURIL) tablet 50 mg   • metFORMIN (GLUCOPHAGE) tablet 500 mg   • midodrine (PROAMATINE) tablet 7.5 mg   • pregabalin (LYRICA) capsule 75 mg   • cefepime (MAXIPIME) 1,000 mg in sodium chloride 0.9 % 100 mL IVPB   • metoPROLOL tartrate (LOPRESSOR) tablet 25 mg   • metoPROLOL tartrate (LOPRESSOR) tablet 50 mg   • insulin glargine (LANTUS) injection 70 Units   • sodium chloride 0.9 % flush bag 25 mL   • Potassium Standard Replacement Protocol (Levels 3.5 and lower)   • potassium CHLORIDE (KLOR-CON M) rishabh ER tablet 40 mEq   • acetaminophen (TYLENOL) tablet 650 mg   • sodium chloride 0.9 % flush bag 25 mL   • sodium chloride (PF) 0.9 % injection 2 mL          Lines/Tubes/Drain      In/Out    Intake/Output Summary (Last 24 hours) at 9/3/2022 0944  Last data filed at 9/3/2022 0600  Gross per 24 hour   Intake --   Output 1650 ml   Net -1650 ml            Labs     Recent Results (from the past 72 hour(s))   GLUCOSE, BEDSIDE - POINT OF CARE    Collection Time: 08/31/22 11:14 AM   Result Value Ref Range    GLUCOSE, BEDSIDE - POINT OF CARE 471 (HH) 70 - 99 mg/dL   GLUCOSE, BEDSIDE - POINT OF CARE    Collection Time: 08/31/22  4:18 PM   Result Value Ref Range    GLUCOSE, BEDSIDE - POINT OF CARE 366 (H) 70 - 99 mg/dL   GLUCOSE, BEDSIDE - POINT OF CARE    Collection Time: 08/31/22  8:33 PM   Result Value Ref Range    GLUCOSE, BEDSIDE - POINT OF CARE 427 (H) 70 - 99 mg/dL   Lactic Acid Venous With Reflex    Collection Time: 09/01/22 12:53 AM   Result Value Ref Range    Lactate, Venous 1.1 0.0 - 2.0 mmol/L   Comprehensive Metabolic Panel    Collection Time: 09/01/22 12:53 AM   Result Value Ref Range    Fasting Status      Sodium 129 (L) 135 - 145 mmol/L    Potassium 3.0 (L) 3.4 - 5.1 mmol/L    Chloride 97 97 - 110 mmol/L    Carbon Dioxide 23 21 - 32 mmol/L    Anion Gap 12 7 - 19 mmol/L    Glucose 370 (H) 70 - 99 mg/dL    BUN 19 6 - 20 mg/dL    Creatinine 0.76  0.51 - 0.95 mg/dL    Glomerular Filtration Rate >90 >=60    BUN/ Creatinine Ratio 25 7 - 25    Calcium 8.7 8.4 - 10.2 mg/dL    Bilirubin, Total 0.4 0.2 - 1.0 mg/dL    GOT/AST 12 <=37 Units/L    GPT/ALT 17 <64 Units/L    Alkaline Phosphatase 145 (H) 45 - 117 Units/L    Albumin 2.0 (L) 3.6 - 5.1 g/dL    Protein, Total 8.5 (H) 6.4 - 8.2 g/dL    Globulin 6.5 (H) 2.0 - 4.0 g/dL    A/G Ratio 0.3 (L) 1.0 - 2.4   Partial Thromboplastin Time    Collection Time: 09/01/22 12:53 AM   Result Value Ref Range    PTT 35 (H) 22 - 30 sec   CBC with Automated Differential (performable only)    Collection Time: 09/01/22 12:53 AM   Result Value Ref Range    WBC 13.6 (H) 4.2 - 11.0 K/mcL    RBC 3.79 (L) 4.00 - 5.20 mil/mcL    HGB 10.2 (L) 12.0 - 15.5 g/dL    HCT 32.4 (L) 36.0 - 46.5 %    MCV 85.5 78.0 - 100.0 fl    MCH 26.9 26.0 - 34.0 pg    MCHC 31.5 (L) 32.0 - 36.5 g/dL    RDW-CV 15.9 (H) 11.0 - 15.0 %    RDW-SD 49.5 39.0 - 50.0 fL     140 - 450 K/mcL    NRBC 0 <=0 /100 WBC    Neutrophil, Percent 78 %    Lymphocytes, Percent 14 %    Mono, Percent 7 %    Eosinophils, Percent 1 %    Basophils, Percent 0 %    Immature Granulocytes 0 %    Absolute Neutrophils 10.6 (H) 1.8 - 7.7 K/mcL    Absolute Lymphocytes 1.8 1.0 - 4.0 K/mcL    Absolute Monocytes 1.0 (H) 0.3 - 0.9 K/mcL    Absolute Eosinophils  0.1 0.0 - 0.5 K/mcL    Absolute Basophils 0.1 0.0 - 0.3 K/mcL    Absolute Immmature Granulocytes 0.1 0.0 - 0.2 K/mcL   Magnesium    Collection Time: 09/01/22 12:53 AM   Result Value Ref Range    Magnesium 1.7 1.7 - 2.4 mg/dL   Thyroid Stimulating Hormone    Collection Time: 09/01/22  4:43 AM   Result Value Ref Range    TSH 2.712 0.350 - 5.000 mcUnits/mL   Free T4    Collection Time: 09/01/22  4:43 AM   Result Value Ref Range    T4, Free 1.4 0.8 - 1.5 ng/dL   Potassium    Collection Time: 09/01/22  4:43 AM   Result Value Ref Range    Potassium 3.5 3.4 - 5.1 mmol/L   GLUCOSE, BEDSIDE - POINT OF CARE    Collection Time: 09/01/22  7:57 AM    Result Value Ref Range    GLUCOSE, BEDSIDE - POINT OF CARE 356 (H) 70 - 99 mg/dL   GLUCOSE, BEDSIDE - POINT OF CARE    Collection Time: 09/01/22 11:31 AM   Result Value Ref Range    GLUCOSE, BEDSIDE - POINT OF CARE 382 (H) 70 - 99 mg/dL   Respiratory Pathogen Panel    Collection Time: 09/01/22 12:33 PM   Result Value Ref Range    Adenovirus Not Detected Not Detected    Bocavirus Not Detected Not Detected    Coronavirus, 229E Not Detected Not Detected    Coronavirus, HKU1 Not Detected Not Detected    Chlamydophila pneumoniae Not Detected Not Detected    Coronavirus, NL63 Not Detected Not Detected    Coronavirus, OC43 Not Detected Not Detected    Influenza A, Subtype H1 Not Detected Not Detected    Influenza A, 2009 H1N1 Subtype Not Detected Not Detected    Influenza A, Subtype H3 Not Detected Not Detected    Influenza A, Not Subtyped Not Applicable Not Detected, Not Applicable    Influenza B Not Detected Not Detected    Legionella Pneumophila Not Detected Not Detected    Metapneumovirus Not Detected Not Detected    Mycoplasma Pneumoniae Not Detected Not Detected    Parainfluenza, Type 1 Not Detected Not Detected    Parainfluenza, Type 2 Not Detected Not Detected    Parainfluenza, Type 3 Not Detected Not Detected    Parainfluenza, Type 4 Not Detected Not Detected    Rhinovirus / Enterovirus Not Detected Not Detected    Respiratory Syncytial Virus (RSV), Subtype A Not Detected Not Detected    Respiratory Syncytial Virus (RSV), Subtype B Not Detected Not Detected    Procedural Notes       This result was obtained by Multiplex Reverse Transcriptase PCR amplification followed by Liquid Bead Array Hybridization.      This test can detect the following viral and microbial pathogens: Adenovirus, Bocavirus, Coronaviruses (229E, OC43, NL63, and HKU1), Enterovirus, Influenza A, Influenza A H1, Influenza A H3, Influenza A 2009 H1N1, Influenza B, Human Metapneumovirus (hMPV), Mycoplasma pneumoniae, Chlamydia pneumoniae,  Parainfluenza Viruses Types 1 - 4, Respiratory Syncytial Viruses A (RSV A),  Respiratory Syncytial Virus B (RSV B), Rhinovirus and Legionella pneumophila.    This test cannot differentiate between Rhinovirus and Enterovirus due to their close genomic homology. Legionella pneumophila was validated and its performance characteristics were determined by [facility].     This test was developed, and its performance characteristics were determined by the FDA. This test is used for clinical purposes. This laboratory is certified under the Clinical Laboratory Improvement Amendments (CLIA) as qualified to perform high complexity clinical laboratory testing.     GLUCOSE, BEDSIDE - POINT OF CARE    Collection Time: 09/01/22  4:27 PM   Result Value Ref Range    GLUCOSE, BEDSIDE - POINT OF CARE 245 (H) 70 - 99 mg/dL   GLUCOSE, BEDSIDE - POINT OF CARE    Collection Time: 09/01/22  8:26 PM   Result Value Ref Range    GLUCOSE, BEDSIDE - POINT OF CARE 220 (H) 70 - 99 mg/dL   Comprehensive Metabolic Panel    Collection Time: 09/02/22 12:14 AM   Result Value Ref Range    Fasting Status      Sodium 127 (L) 135 - 145 mmol/L    Potassium 3.7 3.4 - 5.1 mmol/L    Chloride 96 (L) 97 - 110 mmol/L    Carbon Dioxide 25 21 - 32 mmol/L    Anion Gap 10 7 - 19 mmol/L    Glucose 235 (H) 70 - 99 mg/dL    BUN 18 6 - 20 mg/dL    Creatinine 0.79 0.51 - 0.95 mg/dL    Glomerular Filtration Rate 90 >=60    BUN/ Creatinine Ratio 23 7 - 25    Calcium 9.2 8.4 - 10.2 mg/dL    Bilirubin, Total 0.4 0.2 - 1.0 mg/dL    GOT/AST 21 <=37 Units/L    GPT/ALT 14 <64 Units/L    Alkaline Phosphatase 170 (H) 45 - 117 Units/L    Albumin 1.9 (L) 3.6 - 5.1 g/dL    Protein, Total 8.9 (H) 6.4 - 8.2 g/dL    Globulin 7.0 (H) 2.0 - 4.0 g/dL    A/G Ratio 0.3 (L) 1.0 - 2.4   CBC with Automated Differential (performable only)    Collection Time: 09/02/22 12:14 AM   Result Value Ref Range    WBC 11.1 (H) 4.2 - 11.0 K/mcL    RBC 4.01 4.00 - 5.20 mil/mcL    HGB 10.9 (L) 12.0 -  15.5 g/dL    HCT 34.8 (L) 36.0 - 46.5 %    MCV 86.8 78.0 - 100.0 fl    MCH 27.2 26.0 - 34.0 pg    MCHC 31.3 (L) 32.0 - 36.5 g/dL    RDW-CV 15.9 (H) 11.0 - 15.0 %    RDW-SD 50.7 (H) 39.0 - 50.0 fL     140 - 450 K/mcL    NRBC 0 <=0 /100 WBC    Neutrophil, Percent 77 %    Lymphocytes, Percent 15 %    Mono, Percent 7 %    Eosinophils, Percent 1 %    Basophils, Percent 0 %    Immature Granulocytes 0 %    Absolute Neutrophils 8.6 (H) 1.8 - 7.7 K/mcL    Absolute Lymphocytes 1.6 1.0 - 4.0 K/mcL    Absolute Monocytes 0.8 0.3 - 0.9 K/mcL    Absolute Eosinophils  0.1 0.0 - 0.5 K/mcL    Absolute Basophils 0.0 0.0 - 0.3 K/mcL    Absolute Immmature Granulocytes 0.1 0.0 - 0.2 K/mcL   GLUCOSE, BEDSIDE - POINT OF CARE    Collection Time: 09/02/22  8:07 AM   Result Value Ref Range    GLUCOSE, BEDSIDE - POINT OF CARE 258 (H) 70 - 99 mg/dL   Anaerobe/Aerobe, Bacterial Culture With Gram Stain    Collection Time: 09/02/22 11:38 AM    Specimen: Hip, Right; Aspirate   Result Value Ref Range    CULTURE WITH GRAM STAIN, ANAEROBE/AEROBE Culture in progress.     Gram Stain Many Polymorphonuclear cells.     Gram Stain No epithelial cells seen.     Gram Stain Few Gram positive cocci in pairs.     Gram Stain Very Rare Gram positive cocci in chains.    Synovial Fluid Analysis (includes count, differential and crystal analysis) (performable only)    Collection Time: 09/02/22 11:38 AM   Result Value Ref Range    Fluid Color Brown     Fluid Clarity Turbid     Fluid Volume 30 mL    TNC Fluid 558,085 (H) 0 - 200 /mcL    Crystals, Uric Acid None Seen None Seen    Crystals, Calcium Pyrophosphate None Seen None Seen   Differential, Synovial    Collection Time: 09/02/22 11:38 AM   Result Value Ref Range    Neutrophil, Fluid 98 (H) 0 - 25 %    Monocytes, Fluid 2 0 - 71 %    Total Cells Counted, Synovial Fluid 100    GLUCOSE, BEDSIDE - POINT OF CARE    Collection Time: 09/02/22 12:29 PM   Result Value Ref Range    GLUCOSE, BEDSIDE - POINT OF CARE 246  (H) 70 - 99 mg/dL   GLUCOSE, BEDSIDE - POINT OF CARE    Collection Time: 09/02/22  4:43 PM   Result Value Ref Range    GLUCOSE, BEDSIDE - POINT OF CARE 193 (H) 70 - 99 mg/dL   GLUCOSE, BEDSIDE - POINT OF CARE    Collection Time: 09/02/22  8:19 PM   Result Value Ref Range    GLUCOSE, BEDSIDE - POINT OF CARE 216 (H) 70 - 99 mg/dL   GLUCOSE, BEDSIDE - POINT OF CARE    Collection Time: 09/03/22  7:52 AM   Result Value Ref Range    GLUCOSE, BEDSIDE - POINT OF CARE 254 (H) 70 - 99 mg/dL                 Imaging    XR HIP 2 VIEWS RIGHT W PELVIS 1 VIEW   Final Result      Stable right hip dislocation and deformity with chronic erosion of the   right femoral head and periosteal reaction along the proximal right femur   suggestive of chronic osteomyelitis with or without superimposed   arthropathy or prior trauma.  Other findings as above.  Please refer to CT   chest abdomen and pelvis from the previous day for additional details.      Electronically Signed by: SHANTA GOETZ M.D.    Signed on: 9/3/2022 8:17 AM          CT CHEST ABDOMEN PELVIS W CONTRAST   Final Result      1.   Severe chronic appearing deformity and dislocation of the right hip as   above.  Loculated fluid and gas collection anterior to the right hip joint   suspicious for abscess or septic arthritis.  Recommend clinical correlation   and consider aspiration.   2.   Decubitus ulcers as above with chronic osteomyelitis of the right   ischial tuberosity and chronic appearing loss of the lower sacrum and   coccyx.   3.   Surgical changes of bowel as above with large parastomal hernia along   a left lower quadrant colostomy. Moderate amount of stool in the rectum   despite the presence of a colostomy.   4.   Geographic steatosis.      Electronically Signed by: SHANTA GOETZ M.D.    Signed on: 9/2/2022 7:22 AM          XR CHEST PA OR AP 1 VIEW   Final Result   Impression:       No acute cardiopulmonary process.      Electronically Signed by: ONEIDA HAND M.D.     Signed on: 8/30/2022 5:28 PM          IR LARGE JOINT ASPIRATION OR INJECTION    (Results Pending)       Microbiology Results  (Last 10 results in the past 7 days)    Specimen   Gram Smear   Culture Result   Status       09/02/22  1138         Many Polymorphonuclear cells.  [P]            No epithelial cells seen.  [P]            Few Gram positive cocci in pairs.  [P]            Very Rare Gram positive cocci in chains.  [P]                 [P] - Preliminary Result                          negative...

## 2022-09-21 ENCOUNTER — APPOINTMENT (OUTPATIENT)
Dept: PHARMACY | Facility: CLINIC | Age: 77
End: 2022-09-21

## 2022-09-30 ENCOUNTER — NON-APPOINTMENT (OUTPATIENT)
Age: 77
End: 2022-09-30

## 2023-07-01 ENCOUNTER — NON-APPOINTMENT (OUTPATIENT)
Age: 78
End: 2023-07-01

## 2023-07-07 ENCOUNTER — APPOINTMENT (OUTPATIENT)
Dept: VASCULAR SURGERY | Facility: CLINIC | Age: 78
End: 2023-07-07
Payer: MEDICARE

## 2023-07-07 VITALS — DIASTOLIC BLOOD PRESSURE: 79 MMHG | HEART RATE: 70 BPM | SYSTOLIC BLOOD PRESSURE: 121 MMHG

## 2023-07-07 VITALS
BODY MASS INDEX: 22.08 KG/M2 | HEIGHT: 72 IN | TEMPERATURE: 98.1 F | SYSTOLIC BLOOD PRESSURE: 104 MMHG | DIASTOLIC BLOOD PRESSURE: 67 MMHG | HEART RATE: 72 BPM | WEIGHT: 163 LBS

## 2023-07-07 PROCEDURE — 99203 OFFICE O/P NEW LOW 30 MIN: CPT

## 2023-07-07 PROCEDURE — 93923 UPR/LXTR ART STDY 3+ LVLS: CPT

## 2023-07-11 ENCOUNTER — APPOINTMENT (OUTPATIENT)
Dept: DERMATOLOGY | Facility: CLINIC | Age: 78
End: 2023-07-11
Payer: MEDICARE

## 2023-07-11 DIAGNOSIS — D48.5 NEOPLASM OF UNCERTAIN BEHAVIOR OF SKIN: ICD-10-CM

## 2023-07-11 DIAGNOSIS — L91.8 OTHER HYPERTROPHIC DISORDERS OF THE SKIN: ICD-10-CM

## 2023-07-11 DIAGNOSIS — L65.9 NONSCARRING HAIR LOSS, UNSPECIFIED: ICD-10-CM

## 2023-07-11 PROCEDURE — 11103 TANGNTL BX SKIN EA SEP/ADDL: CPT

## 2023-07-11 PROCEDURE — 99213 OFFICE O/P EST LOW 20 MIN: CPT | Mod: 25

## 2023-07-11 PROCEDURE — 11102 TANGNTL BX SKIN SINGLE LES: CPT

## 2023-07-17 NOTE — PATIENT PROFILE ADULT - HOW PATIENT ADDRESSED, PROFILE
Anna How Severe Is Your Skin Lesion?: mild Have Your Skin Lesions Been Treated?: not been treated Is This A New Presentation, Or A Follow-Up?: Skin Lesions

## 2023-07-18 ENCOUNTER — NON-APPOINTMENT (OUTPATIENT)
Age: 78
End: 2023-07-18

## 2023-07-18 LAB — DERMATOLOGY BIOPSY: NORMAL

## 2023-07-26 ENCOUNTER — APPOINTMENT (OUTPATIENT)
Dept: DERMATOLOGY | Facility: CLINIC | Age: 78
End: 2023-07-26
Payer: MEDICARE

## 2023-07-26 PROCEDURE — 12032 INTMD RPR S/A/T/EXT 2.6-7.5: CPT

## 2023-07-26 PROCEDURE — 11604 EXC TR-EXT MAL+MARG 3.1-4 CM: CPT

## 2023-07-26 PROCEDURE — 99214 OFFICE O/P EST MOD 30 MIN: CPT | Mod: 25

## 2023-07-28 ENCOUNTER — NON-APPOINTMENT (OUTPATIENT)
Age: 78
End: 2023-07-28

## 2023-08-01 ENCOUNTER — APPOINTMENT (OUTPATIENT)
Dept: DERMATOLOGY | Facility: CLINIC | Age: 78
End: 2023-08-01
Payer: MEDICARE

## 2023-08-01 ENCOUNTER — NON-APPOINTMENT (OUTPATIENT)
Age: 78
End: 2023-08-01

## 2023-08-01 PROCEDURE — 12032 INTMD RPR S/A/T/EXT 2.6-7.5: CPT | Mod: 79

## 2023-08-01 PROCEDURE — 11602 EXC TR-EXT MAL+MARG 1.1-2 CM: CPT | Mod: 79

## 2023-08-02 NOTE — PHYSICAL EXAM
[Alert] : alert [Oriented x 3] : ~L oriented x 3 [Well Nourished] : well nourished [Conjunctiva Non-injected] : conjunctiva non-injected [No Visual Lymphadenopathy] : no visual  lymphadenopathy [No Clubbing] : no clubbing [No Edema] : no edema [No Bromhidrosis] : no bromhidrosis [No Chromhidrosis] : no chromhidrosis [FreeTextEntry3] : On the R lateral shoulder is a well healing raised linear scar On the upper L back is a 0.8cm x 0.7cm pink papule with central crust

## 2023-08-02 NOTE — HISTORY OF PRESENT ILLNESS
[FreeTextEntry1] : Excision of nodular BCC on L upper back [de-identified] : 07/26/2023: Surgical consultation for malignant melanoma on the R shoulder and nBCC on the back AND wide local excision of malignant melanoma on the R shoulder 08/01/2023: Excision of nodular BCC on L upper back   Ms. LOAN LIN is a 78 year old F who presents for excision for a nodular BCC on the L upper back. S/p wide local excision for malignant melanoma of the R shoulder 7/26/23 as above with clear margins. Initial pathology 7/11/23 showed superficial spreading malignant melanoma without ulceration and Breslow thickness at least 0.4mm involving all biopsy margins. Path after excision sent to [facility] for expedited read and showed residual melanoma in situ with margins clear.  Initial hx: malignant melanoma (at least pT1a 0.4mm Breslow transected at the base) on the R shoulder and a Mohs surgery consultation for a nodular BCC on the back.  She first noticed the spot on her shoulder in May 2023, and it was asymptomatic. At her yearly full body skin exam in June 2023, it was biopsied and revealed a 0.4mm melanoma transected at the base.  At that time, the spot on her back was also biopsied as a nodular BCC.   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

## 2023-08-08 ENCOUNTER — APPOINTMENT (OUTPATIENT)
Dept: DERMATOLOGY | Facility: CLINIC | Age: 78
End: 2023-08-08
Payer: MEDICARE

## 2023-08-08 PROCEDURE — 99024 POSTOP FOLLOW-UP VISIT: CPT

## 2023-08-09 ENCOUNTER — NON-APPOINTMENT (OUTPATIENT)
Age: 78
End: 2023-08-09

## 2023-08-09 LAB — DERMATOLOGY BIOPSY: NORMAL

## 2023-08-15 ENCOUNTER — APPOINTMENT (OUTPATIENT)
Dept: DERMATOLOGY | Facility: CLINIC | Age: 78
End: 2023-08-15
Payer: MEDICARE

## 2023-08-15 PROCEDURE — 99213 OFFICE O/P EST LOW 20 MIN: CPT

## 2023-08-15 NOTE — HISTORY OF PRESENT ILLNESS
[de-identified] : wound check, healing excellently retained suture removed continue vaseline until healed R shoulder

## 2023-08-31 ENCOUNTER — NON-APPOINTMENT (OUTPATIENT)
Age: 78
End: 2023-08-31

## 2023-09-05 ENCOUNTER — APPOINTMENT (OUTPATIENT)
Dept: DERMATOLOGY | Facility: CLINIC | Age: 78
End: 2023-09-05
Payer: MEDICARE

## 2023-09-05 PROCEDURE — 99213 OFFICE O/P EST LOW 20 MIN: CPT

## 2023-10-04 ENCOUNTER — APPOINTMENT (OUTPATIENT)
Dept: DERMATOLOGY | Facility: CLINIC | Age: 78
End: 2023-10-04
Payer: MEDICARE

## 2023-10-04 PROCEDURE — 99213 OFFICE O/P EST LOW 20 MIN: CPT

## 2023-10-12 NOTE — PATIENT PROFILE ADULT - DO YOU FEEL UNSAFE AT HOME, WORK, OR SCHOOL?
You can access the FollowMyHealth Patient Portal offered by [facility] by registering at the following website: [email or web address] By joining [facility]’s FollowMyHealth portal, you will also be able to view your health information using other applications (apps) compatible with our system. no

## 2023-10-13 ENCOUNTER — APPOINTMENT (OUTPATIENT)
Dept: DERMATOLOGY | Facility: CLINIC | Age: 78
End: 2023-10-13
Payer: MEDICARE

## 2023-10-13 PROCEDURE — 99213 OFFICE O/P EST LOW 20 MIN: CPT

## 2023-11-08 NOTE — ED PROVIDER NOTE - GASTROINTESTINAL, MLM
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

## 2024-01-09 ENCOUNTER — APPOINTMENT (OUTPATIENT)
Dept: DERMATOLOGY | Facility: CLINIC | Age: 79
End: 2024-01-09
Payer: MEDICARE

## 2024-01-09 DIAGNOSIS — L57.0 ACTINIC KERATOSIS: ICD-10-CM

## 2024-01-09 DIAGNOSIS — D22.9 MELANOCYTIC NEVI, UNSPECIFIED: ICD-10-CM

## 2024-01-09 DIAGNOSIS — C44.519 BASAL CELL CARCINOMA OF SKIN OF OTHER PART OF TRUNK: ICD-10-CM

## 2024-01-09 PROCEDURE — 17000 DESTRUCT PREMALG LESION: CPT

## 2024-01-09 PROCEDURE — 99214 OFFICE O/P EST MOD 30 MIN: CPT | Mod: 25

## 2024-01-16 ENCOUNTER — TRANSCRIPTION ENCOUNTER (OUTPATIENT)
Age: 79
End: 2024-01-16

## 2024-04-09 ENCOUNTER — APPOINTMENT (OUTPATIENT)
Dept: DERMATOLOGY | Facility: CLINIC | Age: 79
End: 2024-04-09
Payer: MEDICARE

## 2024-04-09 DIAGNOSIS — L21.9 SEBORRHEIC DERMATITIS, UNSPECIFIED: ICD-10-CM

## 2024-04-09 DIAGNOSIS — D23.9 OTHER BENIGN NEOPLASM OF SKIN, UNSPECIFIED: ICD-10-CM

## 2024-04-09 DIAGNOSIS — C43.61 MALIGNANT MELANOMA OF RIGHT UPPER LIMB, INCLUDING SHOULDER: ICD-10-CM

## 2024-04-09 DIAGNOSIS — Z12.83 ENCOUNTER FOR SCREENING FOR MALIGNANT NEOPLASM OF SKIN: ICD-10-CM

## 2024-04-09 DIAGNOSIS — L82.1 OTHER SEBORRHEIC KERATOSIS: ICD-10-CM

## 2024-04-09 DIAGNOSIS — L40.9 PSORIASIS, UNSPECIFIED: ICD-10-CM

## 2024-04-09 PROCEDURE — 99214 OFFICE O/P EST MOD 30 MIN: CPT

## 2024-04-09 RX ORDER — HYDROCORTISONE 25 MG/G
2.5 OINTMENT TOPICAL
Qty: 30 | Refills: 1 | Status: ACTIVE | COMMUNITY
Start: 2024-01-09 | End: 1900-01-01

## 2024-04-09 RX ORDER — BETAMETHASONE DIPROPIONATE 0.5 MG/G
0.05 OINTMENT, AUGMENTED TOPICAL
Qty: 1 | Refills: 2 | Status: ACTIVE | COMMUNITY
Start: 2024-04-09 | End: 1900-01-01

## 2024-04-09 RX ORDER — KETOCONAZOLE 20.5 MG/ML
2 SHAMPOO, SUSPENSION TOPICAL
Qty: 1 | Refills: 11 | Status: ACTIVE | COMMUNITY
Start: 2024-04-09 | End: 1900-01-01

## 2024-04-11 ENCOUNTER — TRANSCRIPTION ENCOUNTER (OUTPATIENT)
Age: 79
End: 2024-04-11

## 2024-04-11 RX ORDER — FLUOCINONIDE 0.5 MG/ML
0.05 SOLUTION TOPICAL
Qty: 1 | Refills: 6 | Status: ACTIVE | COMMUNITY
Start: 2024-04-09 | End: 1900-01-01

## 2024-05-28 ENCOUNTER — APPOINTMENT (OUTPATIENT)
Dept: OTOLARYNGOLOGY | Facility: CLINIC | Age: 79
End: 2024-05-28
Payer: MEDICARE

## 2024-05-28 VITALS
SYSTOLIC BLOOD PRESSURE: 137 MMHG | DIASTOLIC BLOOD PRESSURE: 80 MMHG | HEIGHT: 62 IN | BODY MASS INDEX: 30.73 KG/M2 | WEIGHT: 167 LBS

## 2024-05-28 DIAGNOSIS — H90.3 SENSORINEURAL HEARING LOSS, BILATERAL: ICD-10-CM

## 2024-05-28 PROCEDURE — 99213 OFFICE O/P EST LOW 20 MIN: CPT

## 2024-05-28 PROCEDURE — 92504 EAR MICROSCOPY EXAMINATION: CPT

## 2024-05-28 RX ORDER — BETAMETHASONE DIPROPIONATE 0.5 MG/G
0.05 OINTMENT TOPICAL
Qty: 1 | Refills: 3 | Status: COMPLETED | COMMUNITY
Start: 2022-06-21 | End: 2024-05-28

## 2024-05-28 NOTE — HISTORY OF PRESENT ILLNESS
[de-identified] : 79 year old female presents for ear checkup and hearing loss  Reports history of bilateral hearing aids Reports hearing is stable Patient denies otalgia, otorrhea, ear infections, tinnitus, dizziness, vertigo, headaches related to hearing.

## 2024-05-28 NOTE — DATA REVIEWED
[de-identified] : An audiogram was ordered and performed including tympanometry, pure tones and speech, for patient's complaint of hearing loss\par  I have independently reviewed the patient's audiogram from today and my findings include dimitry SNHL, stable from years prior\par  \par

## 2024-05-28 NOTE — PROCEDURE
[Sudden Hearing Loss] : Sudden Hearing Loss [Same] : same as the Pre Op Dx. [] : Binocular Microscopy [FreeTextEntry1] : hearing loss [FreeTextEntry6] : Operative microscope was used to examine the ear canal, ear drum and visible middle ear landmarks. Adequate exam would not have been possible without the use of a microscope. Findings are described.\par  \par   [FreeTextEntry4] : none

## 2024-05-29 ENCOUNTER — APPOINTMENT (OUTPATIENT)
Dept: PHARMACY | Facility: CLINIC | Age: 79
End: 2024-05-29
Payer: SELF-PAY

## 2024-05-29 PROCEDURE — V5010 ASSESSMENT FOR HEARING AID: CPT | Mod: NC

## 2024-05-29 NOTE — HISTORY OF PRESENT ILLNESS
[FreeTextEntry1] : 79 year old female with bilateral sensorineural hearing loss.  Consistent user of binaural amplification.  [FreeTextEntry8] : Pt is currently using [employer] Styletto 3 hearing aids purchased in 2020.  Pt is interested in newer technology and keeping current aids as a spare. Medical clearance for new amplification obtained from ENT Dr. Early.  Recent AA revealed a chrystal borderline sloping to moderately severe SNHL au. Pt denies tinnitus, dizziness, otalgia and otorrhea.

## 2024-05-29 NOTE — PROCEDURE
[de-identified] : Reviewed updates in technology from [employer] and [employer].  Discussed ongoing issues with [employer] and that other manufacturers provide better support,  However [employer] is the only company who make the Styletto style.  [employer] Smart ALBARO is similar but much more expensive.  Despite issues with [employer], pt opted for a new paire of [employer] Styletto 3 IX  white with NO. 3 m receivers.  Purchase agreement signed.    Check of current aid revealed dome to be occluded with cerumen.  Cleaned and checked hearing aids, changed domes and wax guards.  Aids found to be in good working order.

## 2024-06-26 ENCOUNTER — APPOINTMENT (OUTPATIENT)
Dept: PHARMACY | Facility: CLINIC | Age: 79
End: 2024-06-26
Payer: SELF-PAY

## 2024-06-26 PROCEDURE — V5261F: CUSTOM

## 2024-07-05 ENCOUNTER — APPOINTMENT (OUTPATIENT)
Dept: VASCULAR SURGERY | Facility: CLINIC | Age: 79
End: 2024-07-05
Payer: MEDICARE

## 2024-07-05 VITALS
HEART RATE: 70 BPM | SYSTOLIC BLOOD PRESSURE: 149 MMHG | WEIGHT: 164 LBS | TEMPERATURE: 97.9 F | DIASTOLIC BLOOD PRESSURE: 70 MMHG | BODY MASS INDEX: 30.18 KG/M2 | HEIGHT: 62 IN

## 2024-07-05 VITALS — HEART RATE: 70 BPM | SYSTOLIC BLOOD PRESSURE: 164 MMHG | DIASTOLIC BLOOD PRESSURE: 90 MMHG

## 2024-07-05 PROCEDURE — 99213 OFFICE O/P EST LOW 20 MIN: CPT

## 2024-07-05 PROCEDURE — 93923 UPR/LXTR ART STDY 3+ LVLS: CPT

## 2024-07-05 RX ORDER — CILOSTAZOL 50 MG/1
50 TABLET ORAL
Refills: 0 | Status: ACTIVE | COMMUNITY

## 2024-07-05 RX ORDER — ASPIRIN 81 MG
81 TABLET,CHEWABLE ORAL
Refills: 0 | Status: ACTIVE | COMMUNITY

## 2024-07-09 ENCOUNTER — APPOINTMENT (OUTPATIENT)
Dept: DERMATOLOGY | Facility: CLINIC | Age: 79
End: 2024-07-09
Payer: MEDICARE

## 2024-07-09 DIAGNOSIS — L21.9 SEBORRHEIC DERMATITIS, UNSPECIFIED: ICD-10-CM

## 2024-07-09 DIAGNOSIS — C43.61 MALIGNANT MELANOMA OF RIGHT UPPER LIMB, INCLUDING SHOULDER: ICD-10-CM

## 2024-07-09 DIAGNOSIS — D23.9 OTHER BENIGN NEOPLASM OF SKIN, UNSPECIFIED: ICD-10-CM

## 2024-07-09 DIAGNOSIS — L82.1 OTHER SEBORRHEIC KERATOSIS: ICD-10-CM

## 2024-07-09 DIAGNOSIS — D22.9 MELANOCYTIC NEVI, UNSPECIFIED: ICD-10-CM

## 2024-07-09 DIAGNOSIS — L70.0 ACNE VULGARIS: ICD-10-CM

## 2024-07-09 DIAGNOSIS — Z12.83 ENCOUNTER FOR SCREENING FOR MALIGNANT NEOPLASM OF SKIN: ICD-10-CM

## 2024-07-09 DIAGNOSIS — L40.9 PSORIASIS, UNSPECIFIED: ICD-10-CM

## 2024-07-09 PROCEDURE — 99214 OFFICE O/P EST MOD 30 MIN: CPT

## 2024-07-09 PROCEDURE — G2211 COMPLEX E/M VISIT ADD ON: CPT

## 2024-08-16 ENCOUNTER — APPOINTMENT (OUTPATIENT)
Dept: PHARMACY | Facility: CLINIC | Age: 79
End: 2024-08-16
Payer: SELF-PAY

## 2024-08-16 PROCEDURE — V5299A: CUSTOM

## 2024-12-04 ENCOUNTER — APPOINTMENT (OUTPATIENT)
Dept: PHARMACY | Facility: CLINIC | Age: 79
End: 2024-12-04
Payer: SELF-PAY

## 2024-12-04 ENCOUNTER — NON-APPOINTMENT (OUTPATIENT)
Age: 79
End: 2024-12-04

## 2024-12-04 PROCEDURE — V5299A: CUSTOM

## 2024-12-16 ENCOUNTER — APPOINTMENT (OUTPATIENT)
Dept: PHARMACY | Facility: CLINIC | Age: 79
End: 2024-12-16
Payer: SELF-PAY

## 2024-12-16 PROCEDURE — V5299A: CUSTOM

## 2024-12-16 PROCEDURE — V5267C: CUSTOM

## 2025-01-03 ENCOUNTER — NON-APPOINTMENT (OUTPATIENT)
Age: 80
End: 2025-01-03

## 2025-01-03 ENCOUNTER — APPOINTMENT (OUTPATIENT)
Dept: DERMATOLOGY | Facility: CLINIC | Age: 80
End: 2025-01-03
Payer: MEDICARE

## 2025-01-03 VITALS — WEIGHT: 163 LBS | BODY MASS INDEX: 29.81 KG/M2

## 2025-01-03 DIAGNOSIS — L21.9 SEBORRHEIC DERMATITIS, UNSPECIFIED: ICD-10-CM

## 2025-01-03 DIAGNOSIS — L40.9 PSORIASIS, UNSPECIFIED: ICD-10-CM

## 2025-01-03 DIAGNOSIS — D48.5 NEOPLASM OF UNCERTAIN BEHAVIOR OF SKIN: ICD-10-CM

## 2025-01-03 DIAGNOSIS — C43.61 MALIGNANT MELANOMA OF RIGHT UPPER LIMB, INCLUDING SHOULDER: ICD-10-CM

## 2025-01-03 DIAGNOSIS — L82.1 OTHER SEBORRHEIC KERATOSIS: ICD-10-CM

## 2025-01-03 DIAGNOSIS — D23.9 OTHER BENIGN NEOPLASM OF SKIN, UNSPECIFIED: ICD-10-CM

## 2025-01-03 DIAGNOSIS — D22.9 MELANOCYTIC NEVI, UNSPECIFIED: ICD-10-CM

## 2025-01-03 DIAGNOSIS — Z12.83 ENCOUNTER FOR SCREENING FOR MALIGNANT NEOPLASM OF SKIN: ICD-10-CM

## 2025-01-03 PROCEDURE — 99213 OFFICE O/P EST LOW 20 MIN: CPT | Mod: 25

## 2025-01-03 PROCEDURE — 11102 TANGNTL BX SKIN SINGLE LES: CPT

## 2025-01-09 LAB — DERMATOLOGY BIOPSY: NORMAL

## 2025-01-13 ENCOUNTER — APPOINTMENT (OUTPATIENT)
Dept: DERMATOLOGY | Facility: CLINIC | Age: 80
End: 2025-01-13
Payer: MEDICARE

## 2025-01-13 ENCOUNTER — NON-APPOINTMENT (OUTPATIENT)
Age: 80
End: 2025-01-13

## 2025-01-13 DIAGNOSIS — C43.59 MALIGNANT MELANOMA OF OTHER PART OF TRUNK: ICD-10-CM

## 2025-01-13 PROCEDURE — 12034 INTMD RPR S/TR/EXT 7.6-12.5: CPT

## 2025-01-13 PROCEDURE — 11604 EXC TR-EXT MAL+MARG 3.1-4 CM: CPT

## 2025-01-14 PROBLEM — C43.59: Status: ACTIVE | Noted: 2025-01-13

## 2025-01-22 ENCOUNTER — NON-APPOINTMENT (OUTPATIENT)
Age: 80
End: 2025-01-22

## 2025-01-23 LAB — DERMATOLOGY BIOPSY: NORMAL

## 2025-03-28 ENCOUNTER — APPOINTMENT (OUTPATIENT)
Dept: PHARMACY | Facility: CLINIC | Age: 80
End: 2025-03-28
Payer: SELF-PAY

## 2025-03-28 PROCEDURE — V5299A: CUSTOM

## 2025-04-02 ENCOUNTER — APPOINTMENT (OUTPATIENT)
Dept: PHARMACY | Facility: CLINIC | Age: 80
End: 2025-04-02
Payer: SELF-PAY

## 2025-04-02 PROCEDURE — V5299A: CUSTOM

## 2025-04-25 ENCOUNTER — APPOINTMENT (OUTPATIENT)
Dept: DERMATOLOGY | Facility: CLINIC | Age: 80
End: 2025-04-25
Payer: MEDICARE

## 2025-04-25 DIAGNOSIS — L21.9 SEBORRHEIC DERMATITIS, UNSPECIFIED: ICD-10-CM

## 2025-04-25 DIAGNOSIS — D22.9 MELANOCYTIC NEVI, UNSPECIFIED: ICD-10-CM

## 2025-04-25 DIAGNOSIS — L40.9 PSORIASIS, UNSPECIFIED: ICD-10-CM

## 2025-04-25 DIAGNOSIS — C43.61 MALIGNANT MELANOMA OF RIGHT UPPER LIMB, INCLUDING SHOULDER: ICD-10-CM

## 2025-04-25 DIAGNOSIS — L82.1 OTHER SEBORRHEIC KERATOSIS: ICD-10-CM

## 2025-04-25 DIAGNOSIS — Z12.83 ENCOUNTER FOR SCREENING FOR MALIGNANT NEOPLASM OF SKIN: ICD-10-CM

## 2025-04-25 DIAGNOSIS — C43.59 MALIGNANT MELANOMA OF OTHER PART OF TRUNK: ICD-10-CM

## 2025-04-25 PROCEDURE — 99214 OFFICE O/P EST MOD 30 MIN: CPT

## 2025-07-11 ENCOUNTER — APPOINTMENT (OUTPATIENT)
Dept: VASCULAR SURGERY | Facility: CLINIC | Age: 80
End: 2025-07-11
Payer: MEDICARE

## 2025-07-11 ENCOUNTER — APPOINTMENT (OUTPATIENT)
Dept: DERMATOLOGY | Facility: CLINIC | Age: 80
End: 2025-07-11

## 2025-07-11 VITALS
HEART RATE: 77 BPM | WEIGHT: 150 LBS | DIASTOLIC BLOOD PRESSURE: 75 MMHG | HEIGHT: 62 IN | SYSTOLIC BLOOD PRESSURE: 133 MMHG | BODY MASS INDEX: 27.6 KG/M2

## 2025-07-11 VITALS — HEART RATE: 78 BPM | SYSTOLIC BLOOD PRESSURE: 133 MMHG | DIASTOLIC BLOOD PRESSURE: 77 MMHG

## 2025-07-11 PROCEDURE — 93922 UPR/L XTREMITY ART 2 LEVELS: CPT

## 2025-07-11 PROCEDURE — 99213 OFFICE O/P EST LOW 20 MIN: CPT

## 2025-07-11 RX ORDER — EVOLOCUMAB 140 MG/ML
INJECTION, SOLUTION SUBCUTANEOUS
Refills: 0 | Status: ACTIVE | COMMUNITY

## 2025-07-11 RX ORDER — EMPAGLIFLOZIN 25 MG/1
TABLET, FILM COATED ORAL
Refills: 0 | Status: ACTIVE | COMMUNITY

## 2025-07-11 RX ORDER — TIRZEPATIDE 7.5 MG/.5ML
INJECTION, SOLUTION SUBCUTANEOUS
Refills: 0 | Status: ACTIVE | COMMUNITY

## 2025-07-28 ENCOUNTER — NON-APPOINTMENT (OUTPATIENT)
Age: 80
End: 2025-07-28